# Patient Record
Sex: FEMALE | Race: WHITE | URBAN - METROPOLITAN AREA
[De-identification: names, ages, dates, MRNs, and addresses within clinical notes are randomized per-mention and may not be internally consistent; named-entity substitution may affect disease eponyms.]

---

## 2019-04-09 ENCOUNTER — PRE VISIT (OUTPATIENT)
Dept: UROLOGY | Facility: CLINIC | Age: 39
End: 2019-04-09

## 2019-04-09 NOTE — TELEPHONE ENCOUNTER
Reason for visit: UTI consult     Relevant information: pt self referred    Records/imaging/labs/orders: no records     Pt called: yes, generic message left     At Rooming: dip/pvr

## 2019-04-18 ENCOUNTER — ANCILLARY PROCEDURE (OUTPATIENT)
Dept: GENERAL RADIOLOGY | Facility: CLINIC | Age: 39
End: 2019-04-18
Attending: UROLOGY
Payer: COMMERCIAL

## 2019-04-18 ENCOUNTER — ANCILLARY PROCEDURE (OUTPATIENT)
Dept: CT IMAGING | Facility: CLINIC | Age: 39
End: 2019-04-18
Attending: UROLOGY
Payer: COMMERCIAL

## 2019-04-18 ENCOUNTER — OFFICE VISIT (OUTPATIENT)
Dept: UROLOGY | Facility: CLINIC | Age: 39
End: 2019-04-18
Payer: COMMERCIAL

## 2019-04-18 VITALS
SYSTOLIC BLOOD PRESSURE: 106 MMHG | DIASTOLIC BLOOD PRESSURE: 73 MMHG | WEIGHT: 116.8 LBS | BODY MASS INDEX: 18.77 KG/M2 | HEART RATE: 89 BPM | HEIGHT: 66 IN

## 2019-04-18 DIAGNOSIS — N39.0 RECURRENT UTI: ICD-10-CM

## 2019-04-18 DIAGNOSIS — N32.3 BLADDER DIVERTICULUM: ICD-10-CM

## 2019-04-18 DIAGNOSIS — N39.0 RECURRENT UTI: Primary | ICD-10-CM

## 2019-04-18 LAB
APPEARANCE UR: CLEAR
BILIRUB UR QL: NORMAL
COLOR UR: YELLOW
GLUCOSE URINE: NORMAL MG/DL
HGB UR QL: NORMAL
KETONES UR QL: 15 MG/DL
LEUKOCYTE ESTERASE URINE: NORMAL
NITRITE UR QL STRIP: NORMAL
PH UR STRIP: 6.5 PH (ref 5–7)
PROTEIN ALBUMIN URINE: NORMAL MG/DL
RADIOLOGIST FLAGS: NORMAL
SOURCE: NORMAL
SP GR UR STRIP: 1.01 (ref 1–1.03)
UROBILINOGEN UR QL STRIP: 0.2 EU/DL (ref 0.2–1)

## 2019-04-18 RX ORDER — IOPAMIDOL 755 MG/ML
100 INJECTION, SOLUTION INTRAVASCULAR ONCE
Status: COMPLETED | OUTPATIENT
Start: 2019-04-18 | End: 2019-04-18

## 2019-04-18 RX ORDER — SULFAMETHOXAZOLE/TRIMETHOPRIM 800-160 MG
1 TABLET ORAL ONCE
Status: ACTIVE | OUTPATIENT
Start: 2019-04-18

## 2019-04-18 RX ADMIN — IOPAMIDOL 25 ML: 755 INJECTION, SOLUTION INTRAVASCULAR at 15:58

## 2019-04-18 ASSESSMENT — PAIN SCALES - GENERAL
PAINLEVEL: NO PAIN (0)
PAINLEVEL: NO PAIN (0)

## 2019-04-18 ASSESSMENT — ENCOUNTER SYMPTOMS
DECREASED LIBIDO: 1
DYSURIA: 1
FLANK PAIN: 1
DIFFICULTY URINATING: 1
HEMATURIA: 0
HOT FLASHES: 0

## 2019-04-18 ASSESSMENT — MIFFLIN-ST. JEOR: SCORE: 1226.55

## 2019-04-18 NOTE — PROGRESS NOTES
2019    Referring Provider:Self    CC: Frequent UTI    HPI:  Domitila Goldberg is a 39 year old  female (3  sections) with a past medical history of endometriosis presents for evaluation of frequent e. coli UTI over the past 3 years.  The patient had no history of UTI before the birth of her last child 3 years ago.  Per report the cesaran section was unremarkable.  Since that time she has had culture positive infections every 6-12 weeks.  Presenting symptoms are typically urgency, frequency and dysuria.  Symptoms resolve with treatment.  It is uncertain if the patient has ever had a negative culture following treatment.  Her last UTI was in 2019.  She previously underwent CT abdomen/pelvis in 2018 without evidence of urolithiasis or other anatomic abnormalities.  She has not undergone cystoscopy in the past.  The patient has no smoking history or history of industrial exposures.      Past medical history - endometriosis    Past surgical history - Laparoscopy x 2;  section x 3    Family History   Problem Relation Age of Onset     Heart Defect Mother        Review of Systems     Constitutional:  Negative for fever, chills, weight loss, weight gain, fatigue, decreased appetite, night sweats, recent stressors, height gain, height loss, post-operative complications, incisional pain, hallucinations, increased energy, hyperactivity and confused.   HENT:  Negative for ear pain, hearing loss, tinnitus, nosebleeds, trouble swallowing, hoarse voice, mouth sores, sore throat, ear discharge, tooth pain, gum tenderness, taste disturbance, smell disturbance, hearing aid, bleeding gums, dry mouth, sinus pain, sinus congestion and neck mass.    Eyes:  Negative for double vision, pain, redness, eye pain, decreased vision, eye watering, eye bulging, eye dryness, flashing lights, spots, floaters, strabismus, tunnel vision, jaundice and eye irritation.   Respiratory:   Negative for cough,  hemoptysis, sputum production, shortness of breath, wheezing, sleep disturbances due to breathing, snores loudly, respiratory pain, dyspnea on exertion, cough disturbing sleep and postural dyspnea.    Cardiovascular:  Negative for chest pain, dyspnea on exertion, palpitations, orthopnea, claudication, leg swelling, fingers/toes turn blue, hypertension, hypotension, syncope, history of heart murmur, chest pain on exertion, chest pain at rest, pacemaker, few scattered varicosities, leg pain, sleep disturbances due to breathing, tachycardia, light-headedness, exercise intolerance and edema.   Gastrointestinal:  Negative for heartburn, nausea, vomiting, abdominal pain, diarrhea, constipation, blood in stool, melena, rectal pain, bloating, hemorrhoids, bowel incontinence, jaundice, rectal bleeding, coffee ground emesis and change in stool.   Genitourinary:  Positive for bladder incontinence, dysuria, flank pain, difficulty urinating, dyspareunia, decreased libido and excessive menstruation. Negative for urgency, hematuria, vaginal discharge, genital sores, nocturia, voiding less frequently, arousal difficulty, abnormal vaginal bleeding, menstrual changes, hot flashes, vaginal dryness and postmenopausal bleeding.   Musculoskeletal:  Negative for myalgias, back pain, joint swelling, arthralgias, stiffness, muscle cramps, neck pain, bone pain, muscle weakness and fracture.   Skin:  Negative for nail changes, itching, poor wound healing, rash, hair changes, skin changes, acne, warts, poor wound healing, scarring, flaky skin, Raynaud's phenomenon, sensitivity to sunlight and skin thickening.   Neurological:  Negative for dizziness, tingling, tremors, speech change, seizures, loss of consciousness, weakness, light-headedness, numbness, headaches, disturbances in coordination, extremity numbness, memory loss, difficulty walking and paralysis.   Endo/Heme:  Negative for anemia, swollen glands and bruises/bleeds easily.  "  Psychiatric/Behavioral:  Negative for depression, hallucinations, memory loss, decreased concentration, mood swings and panic attacks.    Breast:  Negative for breast discharge, breast mass, breast pain and nipple retraction.   Endocrine:  Negative for altered temperature regulation, polyphagia, polydipsia, unwanted hair growth and change in facial hair.    Allergies   Allergen Reactions     Penicillins Hives     As child     Current Outpatient Medications   Medication     Multiple Vitamins-Iron (MULTIPLE VITAMIN/IRON OR)     No current facility-administered medications for this visit.      /73   Pulse 89   Ht 1.676 m (5' 6\")   Wt 53 kg (116 lb 12.8 oz)   BMI 18.85 kg/m   No LMP recorded. Body mass index is 18.85 kg/m .  She is alert and oriented.  She is well groomed, comfortable in no acute distress. Normal mood and affect. Pupils are equal and reactive, no scleral icterus.  Normocephalic without masses, lesions, obvious abnormalities. Non labored breathing.  Abdomen is soft, non-tender, non-distended, no CVAT, no organomegaly. Normal external female genitalia.  Negative ESST.  Pelvic exam is unremarkable.  Normal range of motion in her extremities.  Skin warm without obvious lesions.  No lower extremity edema.  Normal gait.      Cystoscopy:  -Given the temporal relationship to the patient's  section and the fact that patient lives a distance away a decision was made to proceed with in-office cystoscopy to evaluate for foreign bodies and/or anatomic abnormalities.  The patient was prepped and draped in a the normal sterile fashion.  A flexible cystosope was introduced into a well lubricated urethra.  The urethra was unremarkable.  Both UOs were identified.  A transverse ridge was noted along the anterior bladder wall without apparent foreign body present.  A large diverticulum was noted at the bladder dome - the wall was found to be extremely thin with the overlying bowel visible. No other " abnormalities were noted.  Pullback urethroscopy was unremarkable.      CT cystogram/Post-void KUB images were reviewed with the patient  -Given the findings on cystoscopy we ordered a CT cystogram that was performed today  -Cystogram reveals large bladder dome diverticulum.  Postvoid KUB reveals incomplete voiding    A/P: Domitila Goldberg is a 39 year old F with recurrent UTI following  section 3 years ago.  Given the noted findings on cystoscopy and imaging along with the temporal relationship of the patient's emergency  section to the onset of her infections, there is concern that a bladder injury may have resulted in a poorly draining bladder diverticulum.  We will plan to send urine for culture today and have the patient follow up in 3 months for further evaluation and discussion of possible intervention should infections continue.    -Urine culture today  -Follow up in 3 months     Addendum:    The patient was seen and evaluated with the resident.  I was present for the cystoscopy procedure.  The plan was formulated in conjunction with me and I agree with the above note with changes made as necessary.    Discussed that at this time it is not clear the etiology of the UTIs but would like to do more careful monitoring of her urine so if she things she has a UTI I would like her to contact the clinic so we can monitor    Discussed options to prevent UTI including daily antibiotic prophylaxis, methenamine and supplements.  She is most interested in supplements.      Ultimately at this time it is not fully clear if an incompletely emptying diverticulum is contributing and discussed that if we discussed more aggressive management that it would involve surgery to remove the diverticulum.  We did discuss however things for her to seek urgent medical attention for given how thin the diverticular wall is.    60 minutes were spent with patient today, >50% in counseling and coordination of care including  the time of the cystoscopy    Julissa Nunez MD MPH   of Urology    CC  No care team member to display  SELF, REFERRED

## 2019-04-18 NOTE — NURSING NOTE
Chief Complaint   Patient presents with     Consult     Freddy   Invasive Procedure Safety Checklist:    Procedure: Cystoscopy    Action: Complete sections and checkboxes as appropriate.    Pre-procedure:  1. Patient ID Verified with 2 identifiers (Carmen and  or MRN) : YES    2. Procedure and site verified with patient/designee (when able) : YES    3. Accurate consent documentation in medical record : YES    4. H&P (or appropriate assessment) documented in medical record : N/A  H&P must be up to 30 days prior to procedure an updated within 24 hours of                 Procedure as applicable.     5. Relevant diagnostic and radiology test results appropriately labeled and displayed as applicable : YES    6. Blood products, implants, devices, and/or special equipment available for the procedure as applicable : YES    7. Procedure site(s) marked with provider initials [Exclusions: N/A] : YES    8. Marking not required. Reason : Yes  Procedure does not require site marking    Time Out:     Time-Out performed immediately prior to starting procedure, including verbal and active participation of all team members addressing: YES    1. Correct patient identity.  2. Confirmed that the correct side and site are marked.  3. An accurate procedure to be done.  4. Agreement on the procedure to be done.  5. Correct patient position.  6. Relevant images and results are properly labeled and appropriately displayed.  7. The need to administer antibiotics or fluids for irrigation purposes during the procedure as applicable.  8. Safety precautions based on patient history or medication use.    During Procedure: Verification of correct person, site, and procedure occurs any time the responsibility for care of the patient is transferred to another member of the care team.  The following medication was given:     MEDICATION:  Lidocaine Jelly  ROUTE: Urethral  SITE: Urethral  DOSE: 10ml  LOT #: [id number]  : Jeannie AUSTIN  EXPIRATION  "DATE: 12-20  NDC#: [address]-8   Was there drug waste? No    Prior to administraion, verified patient identity using patient's name and date of birth.    Drug Amount Wasted:  None.  Vial/Syringe: Single dose vial    Lynn Addison LPN  April 18, 2019      Blood pressure 106/73, pulse 89, height 1.676 m (5' 6\"), weight 53 kg (116 lb 12.8 oz). Body mass index is 18.85 kg/m .    There is no problem list on file for this patient.      Allergies   Allergen Reactions     Penicillins Hives     As child       Current Outpatient Medications   Medication Sig Dispense Refill     Multiple Vitamins-Iron (MULTIPLE VITAMIN/IRON OR)          Social History     Tobacco Use     Smoking status: Never Smoker     Smokeless tobacco: Never Used   Substance Use Topics     Alcohol use: Never     Drug use: Never       Lynn Addison LPN  4/18/2019  1:56 PM     "

## 2019-04-18 NOTE — LETTER
Date:April 25, 2019      Patient was self referred, no letter generated. Do not send.        [facility] Health Information

## 2019-04-18 NOTE — LETTER
2019       RE: Domitila Goldberg  [address]  [place] [facility] [address]     Dear Colleague,    Thank you for referring your patient, Domitila Goldberg, to the [facility] AND [facility] PROSTATE [facility] UROLOGIC CANCERS at [facility]. Please see a copy of my visit note below.    2019    Referring Provider:Self    CC: Frequent UTI    HPI:  oDmitila Goldberg is a 39 year old  female (3  sections) with a past medical history of endometriosis presents for evaluation of frequent e. coli UTI over the past 3 years.  The patient had no history of UTI before the birth of her last child 3 years ago.  Per report the cesaran section was unremarkable.  Since that time she has had culture positive infections every 6-12 weeks.  Presenting symptoms are typically urgency, frequency and dysuria.  Symptoms resolve with treatment.  It is uncertain if the patient has ever had a negative culture following treatment.  Her last UTI was in 2019.  She previously underwent CT abdomen/pelvis in 2018 without evidence of urolithiasis or other anatomic abnormalities.  She has not undergone cystoscopy in the past.  The patient has no smoking history or history of industrial exposures.      Past medical history - endometriosis    Past surgical history - Laparoscopy x 2;  section x 3    Family History   Problem Relation Age of Onset     Heart Defect Mother        Review of Systems     Constitutional:  Negative for fever, chills, weight loss, weight gain, fatigue, decreased appetite, night sweats, recent stressors, height gain, height loss, post-operative complications, incisional pain, hallucinations, increased energy, hyperactivity and confused.   HENT:  Negative for ear pain, hearing loss, tinnitus, nosebleeds, trouble swallowing, hoarse voice, mouth sores, sore throat, ear discharge, tooth pain, gum tenderness, taste disturbance, smell disturbance, hearing aid,  bleeding gums, dry mouth, sinus pain, sinus congestion and neck mass.    Eyes:  Negative for double vision, pain, redness, eye pain, decreased vision, eye watering, eye bulging, eye dryness, flashing lights, spots, floaters, strabismus, tunnel vision, jaundice and eye irritation.   Respiratory:   Negative for cough, hemoptysis, sputum production, shortness of breath, wheezing, sleep disturbances due to breathing, snores loudly, respiratory pain, dyspnea on exertion, cough disturbing sleep and postural dyspnea.    Cardiovascular:  Negative for chest pain, dyspnea on exertion, palpitations, orthopnea, claudication, leg swelling, fingers/toes turn blue, hypertension, hypotension, syncope, history of heart murmur, chest pain on exertion, chest pain at rest, pacemaker, few scattered varicosities, leg pain, sleep disturbances due to breathing, tachycardia, light-headedness, exercise intolerance and edema.   Gastrointestinal:  Negative for heartburn, nausea, vomiting, abdominal pain, diarrhea, constipation, blood in stool, melena, rectal pain, bloating, hemorrhoids, bowel incontinence, jaundice, rectal bleeding, coffee ground emesis and change in stool.   Genitourinary:  Positive for bladder incontinence, dysuria, flank pain, difficulty urinating, dyspareunia, decreased libido and excessive menstruation. Negative for urgency, hematuria, vaginal discharge, genital sores, nocturia, voiding less frequently, arousal difficulty, abnormal vaginal bleeding, menstrual changes, hot flashes, vaginal dryness and postmenopausal bleeding.   Musculoskeletal:  Negative for myalgias, back pain, joint swelling, arthralgias, stiffness, muscle cramps, neck pain, bone pain, muscle weakness and fracture.   Skin:  Negative for nail changes, itching, poor wound healing, rash, hair changes, skin changes, acne, warts, poor wound healing, scarring, flaky skin, Raynaud's phenomenon, sensitivity to sunlight and skin thickening.   Neurological:   "Negative for dizziness, tingling, tremors, speech change, seizures, loss of consciousness, weakness, light-headedness, numbness, headaches, disturbances in coordination, extremity numbness, memory loss, difficulty walking and paralysis.   Endo/Heme:  Negative for anemia, swollen glands and bruises/bleeds easily.   Psychiatric/Behavioral:  Negative for depression, hallucinations, memory loss, decreased concentration, mood swings and panic attacks.    Breast:  Negative for breast discharge, breast mass, breast pain and nipple retraction.   Endocrine:  Negative for altered temperature regulation, polyphagia, polydipsia, unwanted hair growth and change in facial hair.    Allergies   Allergen Reactions     Penicillins Hives     As child     Current Outpatient Medications   Medication     Multiple Vitamins-Iron (MULTIPLE VITAMIN/IRON OR)     No current facility-administered medications for this visit.      /73   Pulse 89   Ht 1.676 m (5' 6\")   Wt 53 kg (116 lb 12.8 oz)   BMI 18.85 kg/m    No LMP recorded. Body mass index is 18.85 kg/m .  She is alert and oriented.  She is well groomed, comfortable in no acute distress. Normal mood and affect. Pupils are equal and reactive, no scleral icterus.  Normocephalic without masses, lesions, obvious abnormalities. Non labored breathing.  Abdomen is soft, non-tender, non-distended, no CVAT, no organomegaly. Normal external female genitalia.  Negative ESST.  Pelvic exam is unremarkable.  Normal range of motion in her extremities.  Skin warm without obvious lesions.  No lower extremity edema.  Normal gait.      Cystoscopy:  -Given the temporal relationship to the patient's  section and the fact that patient lives a distance away a decision was made to proceed with in-office cystoscopy to evaluate for foreign bodies and/or anatomic abnormalities.  The patient was prepped and draped in a the normal sterile fashion.  A flexible cystosope was introduced into a well " lubricated urethra.  The urethra was unremarkable.  Both UOs were identified.  A transverse ridge was noted along the anterior bladder wall without apparent foreign body present.  A large diverticulum was noted at the bladder dome - the wall was found to be extremely thin with the overlying bowel visible. No other abnormalities were noted.  Pullback urethroscopy was unremarkable.      CT cystogram/Post-void KUB images were reviewed with the patient  -Given the findings on cystoscopy we ordered a CT cystogram that was performed today  -Cystogram reveals large bladder dome diverticulum.  Postvoid KUB reveals incomplete voiding    A/P: Domitila Goldberg is a 39 year old F with recurrent UTI following  section 3 years ago.  Given the noted findings on cystoscopy and imaging along with the temporal relationship of the patient's emergency  section to the onset of her infections, there is concern that a bladder injury may have resulted in a poorly draining bladder diverticulum.  We will plan to send urine for culture today and have the patient follow up in 3 months for further evaluation and discussion of possible intervention should infections continue.    -Urine culture today  -Follow up in 3 months     Addendum:    The patient was seen and evaluated with the resident.  The plan was formulated in conjunction with me and I agree with the above note with changes made as necessary.    Discussed that at this time it is not clear the etiology of the UTIs but would like to do more careful monitoring of her urine so if she things she has a UTI I would like her to contact the clinic so we can monitor    Discussed options to prevent UTI including daily antibiotic prophylaxis, methenamine and supplements.  She is most interested in supplements.      Ultimately at this time it is not fully clear if an incompletely emptying diverticulum is contributing and discussed that if we discussed more aggressive management that  it would involve surgery to remove the diverticulum.  We did discuss however things for her to seek urgent medical attention for given how thin the diverticular wall is.    60 minutes were spent with patient today, >50% in counseling and coordination of care    Julissa Nunez MD MPH   of Urology      No care team member to display  SELF, REFERRED                      Again, thank you for allowing me to participate in the care of your patient.      Sincerely,    Julissa Nunez MD

## 2019-04-18 NOTE — NURSING NOTE
"Chief Complaint   Patient presents with     Consult     Freddy       Blood pressure 106/73, pulse 89, height 1.676 m (5' 6\"), weight 53 kg (116 lb 12.8 oz). Body mass index is 18.85 kg/m .    There is no problem list on file for this patient.      Allergies   Allergen Reactions     Penicillins Hives     As child       Current Outpatient Medications   Medication Sig Dispense Refill     Multiple Vitamins-Iron (MULTIPLE VITAMIN/IRON OR)          Social History     Tobacco Use     Smoking status: Never Smoker     Smokeless tobacco: Never Used   Substance Use Topics     Alcohol use: Never     Drug use: Never       ROLLY Cochran  4/18/2019  1:16 PM     "

## 2019-04-18 NOTE — PATIENT INSTRUCTIONS
Supplements to prevent UTI  -probiotics  -cranberry   [employer]ura: [email or web address]   Theracran HP by [employer]  -d-mannose    Any questions please contact us at [phone] or [phone]    It was a pleasure meeting with you today.  Thank you for allowing me and my team the privilege of caring for you today.  YOU are the reason we are here, and I truly hope we provided you with the excellent service you deserve.  Please let us know if there is anything else we can do for you so that we can be sure you are leaving completely satisfied with your care experience.    Cystoscopy    Cystoscopy is a procedure that lets your doctor look directly inside your urethra and bladder. It can be used to:    Help diagnose a problem with your urethra, bladder, or kidneys.    Take a sample (biopsy) of bladder or urethral tissue.    Treat certain problems (such as removing kidney stones).    Place a stent to bypass an obstruction.    Take special X-rays of the kidneys.  Based on the findings, your doctor may recommend other tests or treatments.  What is a cystoscope?  A cystoscope is a telescope-like instrument that contains lenses and fiberoptics (small glass wires that make bright light). The cystoscope may be straight and rigid, or flexible to bend around curves in the urethra. The doctor may look directly into the cystoscope, or project the image onto a monitor.  Getting ready    Ask your doctor if you should stop taking any medicines before the procedure.    Follow any other instructions your doctor gives you.  Tell your doctor before the exam if you:    Take any medicines, such as aspirin or blood thinners    Have allergies to any medicines    Are pregnant   The procedure  Cystoscopy is done in the doctor s office, surgery center, or hospital. The doctor and a nurse are present during the procedure. It takes only a few minutes, longer if a biopsy, X-ray, or treatment needs to be done.  During the procedure:    You lie on an exam  table on your back, knees bent and legs apart. You are covered with a drape.    Your urethra and the area around it are washed. Anesthetic jelly may be applied to numb the urethra.    The cystoscope is inserted. A sterile fluid is put into the bladder to expand it. You may feel pressure from this fluid.    When the procedure is done, the cystoscope is removed.  After the procedure   Once you re home:    Drink plenty of fluids.    You may have burning or light bleeding when you urinate this is normal.    Medicines may be prescribed to ease any discomfort or prevent infection. Take these as directed.    Call your doctor if you have heavy bleeding or blood clots, burning that lasts more than a day, a fever over 100 F  (38  C), or trouble urinating.  Date Last Reviewed: 1/1/2017 2000-2017 [employer]. [address] [place] [id number]. All rights reserved. This information is not intended as a substitute for professional medical care. Always follow your healthcare professional's instructions.

## 2019-04-18 NOTE — DISCHARGE INSTRUCTIONS

## 2019-04-18 NOTE — NURSING NOTE
The following medication was given:     MEDICATION:  Sulfamethoxazole and trimethoprim  ROUTE: PO  SITE: Medication was given orally   DOSE: 800 mg/160 mg  LOT #: [id number]  : Ryan  EXPIRATION DATE: 09/2019  NDC#: [address]-90   Was there drug waste? No    Prior to administration, verified patient identity using patient's name and date of birth.  Due to administration, patient instructed to remain in clinic for 15 minutes  afterwards, and to report any adverse reaction to me immediately.        Drug Amount Wasted:  None.  Vial/Syringe: single dose    Verna Polk CMA  April 18, 2019

## 2019-04-24 ASSESSMENT — ENCOUNTER SYMPTOMS
DECREASED CONCENTRATION: 0
POLYDIPSIA: 0
SHORTNESS OF BREATH: 0
WEIGHT GAIN: 0
TREMORS: 0
INCREASED ENERGY: 0
MUSCLE CRAMPS: 0
HEMOPTYSIS: 0
JOINT SWELLING: 0
HEMATURIA: 0
NAUSEA: 0
CONSTIPATION: 0
BREAST PAIN: 0
DIFFICULTY URINATING: 1
RECTAL BLEEDING: 0
WHEEZING: 0
JAUNDICE: 0
MEMORY LOSS: 0
CLAUDICATION: 0
EYE IRRITATION: 0
PANIC: 0
POOR WOUND HEALING: 0
LEG PAIN: 0
SYNCOPE: 0
SORE THROAT: 0
NIGHT SWEATS: 0
SKIN CHANGES: 0
PALPITATIONS: 0
EYE REDNESS: 0
HYPERTENSION: 0
MUSCLE WEAKNESS: 0
INSOMNIA: 0
TASTE DISTURBANCE: 0
BRUISES/BLEEDS EASILY: 0
EYE PAIN: 0
SPEECH CHANGE: 0
FEVER: 0
STIFFNESS: 0
SWOLLEN GLANDS: 0
HOARSE VOICE: 0
HEARTBURN: 0
DISTURBANCES IN COORDINATION: 0
WEIGHT LOSS: 0
TINGLING: 0
NECK MASS: 0
ORTHOPNEA: 0
MYALGIAS: 0
POSTURAL DYSPNEA: 0
BOWEL INCONTINENCE: 0
DEPRESSION: 0
SNORES LOUDLY: 0
COUGH: 0
POLYPHAGIA: 0
BACK PAIN: 0
LOSS OF CONSCIOUSNESS: 0
WEAKNESS: 0
SINUS PAIN: 0
HYPOTENSION: 0
PARALYSIS: 0
BLOATING: 0
HOT FLASHES: 0
ALTERED TEMPERATURE REGULATION: 0
SLEEP DISTURBANCES DUE TO BREATHING: 0
HALLUCINATIONS: 0
DIZZINESS: 0
NAIL CHANGES: 0
SMELL DISTURBANCE: 0
DECREASED LIBIDO: 1
NERVOUS/ANXIOUS: 0
DECREASED APPETITE: 0
RESPIRATORY PAIN: 0
LEG SWELLING: 0
ARTHRALGIAS: 0
NUMBNESS: 0
DYSURIA: 1
SINUS CONGESTION: 0
ABDOMINAL PAIN: 0
LIGHT-HEADEDNESS: 0
RECTAL PAIN: 0
SEIZURES: 0
TACHYCARDIA: 0
NECK PAIN: 0
BREAST MASS: 0
TROUBLE SWALLOWING: 0
FATIGUE: 0
DIARRHEA: 0
DOUBLE VISION: 0
COUGH DISTURBING SLEEP: 0
HEADACHES: 0
EYE WATERING: 0
EXERCISE INTOLERANCE: 0
BLOOD IN STOOL: 0
SPUTUM PRODUCTION: 0
FLANK PAIN: 1
EXTREMITY NUMBNESS: 0
DYSPNEA ON EXERTION: 0
CHILLS: 0
VOMITING: 0

## 2024-08-26 ENCOUNTER — OFFICE VISIT (OUTPATIENT)
Dept: OBSTETRICS AND GYNECOLOGY | Facility: CLINIC | Age: 44
End: 2024-08-26
Payer: COMMERCIAL

## 2024-08-26 VITALS
SYSTOLIC BLOOD PRESSURE: 108 MMHG | DIASTOLIC BLOOD PRESSURE: 64 MMHG | BODY MASS INDEX: 19.77 KG/M2 | HEIGHT: 66 IN | WEIGHT: 123 LBS

## 2024-08-26 DIAGNOSIS — Z12.31 BREAST CANCER SCREENING BY MAMMOGRAM: ICD-10-CM

## 2024-08-26 DIAGNOSIS — Z01.419 PAP SMEAR, AS PART OF ROUTINE GYNECOLOGICAL EXAMINATION: ICD-10-CM

## 2024-08-26 DIAGNOSIS — Z11.51 SPECIAL SCREENING EXAMINATION FOR HUMAN PAPILLOMAVIRUS (HPV): ICD-10-CM

## 2024-08-26 DIAGNOSIS — R10.2 PELVIC PAIN: ICD-10-CM

## 2024-08-26 DIAGNOSIS — Z91.89 RELIES ON PARTNER VASECTOMY FOR CONTRACEPTION: ICD-10-CM

## 2024-08-26 DIAGNOSIS — Z01.419 ROUTINE GYNECOLOGICAL EXAMINATION: Primary | ICD-10-CM

## 2024-08-26 LAB
B-HCG UR QL: NEGATIVE
BILIRUB BLD-MCNC: NEGATIVE MG/DL
CLARITY, POC: CLEAR
COLOR UR: YELLOW
EXPIRATION DATE: NORMAL
GLUCOSE UR STRIP-MCNC: NEGATIVE MG/DL
INTERNAL NEGATIVE CONTROL: NORMAL
INTERNAL POSITIVE CONTROL: NORMAL
KETONES UR QL: NEGATIVE
LEUKOCYTE EST, POC: NEGATIVE
Lab: NORMAL
NITRITE UR-MCNC: NEGATIVE MG/ML
PH UR: 5 [PH] (ref 5–8)
PROT UR STRIP-MCNC: NEGATIVE MG/DL
RBC # UR STRIP: NEGATIVE /UL
SP GR UR: 1.03 (ref 1–1.03)
UROBILINOGEN UR QL: NORMAL

## 2024-08-26 PROCEDURE — 81025 URINE PREGNANCY TEST: CPT | Performed by: NURSE PRACTITIONER

## 2024-08-26 PROCEDURE — 81002 URINALYSIS NONAUTO W/O SCOPE: CPT | Performed by: NURSE PRACTITIONER

## 2024-08-26 PROCEDURE — 99386 PREV VISIT NEW AGE 40-64: CPT | Performed by: NURSE PRACTITIONER

## 2024-08-26 RX ORDER — SULFAMETHOXAZOLE AND TRIMETHOPRIM 400; 80 MG/1; MG/1
TABLET ORAL
COMMUNITY
Start: 2024-08-14

## 2024-08-26 NOTE — PROGRESS NOTES
New GYN Exam    CC- Here for AE/pelvic pain.     Ekaterina Ortez is a 44 y.o. female new patient who presents for AE/Pelvic pain  Periods are regular every 28-30 days, lasting 5-7 days. Cycles have lightened up a little with occasional clots. Cramping has become worse over the last 2 months; R>L; occasionally radiates around her back. Feels bloated.  Denies bladder issues or changes in bowel habits.   HX of endometriosis dx'd via laparoscopy in .  Hx of C/S x 3.    OB History          3    Para   3    Term   2       1    AB        Living   3         SAB        IAB        Ectopic        Molar        Multiple        Live Births   3          Obstetric Comments   C/S x 3               Menarche: 15 y.o.  Current contraception: vasectomy  History of abnormal Pap smear: no  History of abnormal mammogram: yes - showed fatty tissue  Family history of uterine, colon or ovarian cancer: no  Family history of breast cancer: no  H/o STDs: no  Last pap: 3-4 years ago  Last mammogram: diagnostic at 33 y.o.  Last colonoscopy: never had one  Gardasil: missed  VIK: none    Health Maintenance   Topic Date Due    Annual Gynecologic Pelvic and Breast Exam  Never done    MAMMOGRAM  Never done    TDAP/TD VACCINES (1 - Tdap) Never done    COVID-19 Vaccine ( -  season) Never done    HEPATITIS C SCREENING  Never done    ANNUAL PHYSICAL  Never done    PAP SMEAR  Never done    INFLUENZA VACCINE  2024    Pneumococcal Vaccine 0-64  Aged Out       Past Medical History:   Diagnosis Date    Endometriosis        Past Surgical History:   Procedure Laterality Date     SECTION      x 3    DIAGNOSTIC LAPAROSCOPY      x 2         Current Outpatient Medications:     sulfamethoxazole-trimethoprim (BACTRIM,SEPTRA) 400-80 MG tablet, TAKE 1 TABLET BY MOUTH AFTER INTERCOURSE AND 12 HOURS AFTER SEXUAL ACTIVITY, Disp: , Rfl:     Allergies   Allergen Reactions    Penicillins Anaphylaxis    Keflex [Cephalexin] Rash  "      Social History     Tobacco Use    Smoking status: Never    Smokeless tobacco: Never   Vaping Use    Vaping status: Never Used   Substance Use Topics    Alcohol use: Never    Drug use: Never       Family History   Problem Relation Age of Onset    Uterine cancer Neg Hx     Colon cancer Neg Hx     Ovarian cancer Neg Hx     Breast cancer Neg Hx        Review of Systems   Gastrointestinal:  Positive for abdominal distention. Negative for abdominal pain, constipation and diarrhea.   Genitourinary:  Positive for pelvic pain. Negative for breast discharge, breast lump, breast pain, difficulty urinating, dysuria and menstrual problem.   Musculoskeletal:  Positive for back pain.        /64   Ht 167.6 cm (66\")   Wt 55.8 kg (123 lb)   LMP 08/07/2024   BMI 19.85 kg/m²     Physical Exam  Vitals reviewed.   Constitutional:       General: She is awake. She is not in acute distress.     Appearance: She is not ill-appearing.   HENT:      Head: Normocephalic and atraumatic.   Eyes:      Conjunctiva/sclera: Conjunctivae normal.   Pulmonary:      Effort: Pulmonary effort is normal. No respiratory distress.   Chest:   Breasts:     Right: Normal.      Left: Normal.   Abdominal:      Palpations: Abdomen is soft. There is no mass.      Tenderness: There is no abdominal tenderness.   Genitourinary:     General: Normal vulva.      Exam position: Supine.      Pubic Area: No rash.       Labia:         Right: No rash.         Left: No rash.       Urethra: No urethral lesion.      Vagina: Normal.      Cervix: Normal.      Uterus: Normal.       Adnexa: Right adnexa normal and left adnexa normal.   Musculoskeletal:      Cervical back: Neck supple. No rigidity.   Lymphadenopathy:      Upper Body:      Right upper body: No axillary adenopathy.      Left upper body: No axillary adenopathy.      Lower Body: No right inguinal adenopathy. No left inguinal adenopathy.   Skin:     General: Skin is warm and dry.      Capillary Refill: " Capillary refill takes less than 2 seconds.   Neurological:      Mental Status: She is alert and oriented to person, place, and time.   Psychiatric:         Mood and Affect: Mood and affect normal.         Behavior: Behavior normal.            Assessment/Plan  1) AE  2) GYN HM: pap/HPV  SBE demonstrated and encouraged.  3) MMG:  due, will schedule  4) Colon Health: routine screening recommended if not up to date  5) STD screening: declines   6) Gardasil: missed  7) Contraception: vasectomy  8) family planning: childbearing completed: encourage folic acid daily  9) Body mass index is 19.85 kg/m². Diet and Exercise discussed  10) Smoking Status: No  11) Social: recently moved to this area 2 years ago  12) pelvic pain- schedule pelvic US      Diagnoses and all orders for this visit:    1. Routine gynecological examination (Primary)  -     POC Urinalysis Dipstick  -     POC Pregnancy, Urine  -     IGP, Apt HPV,rfx 16 / 18,45    2. Special screening examination for human papillomavirus (HPV)  -     IGP, Apt HPV,rfx 16 / 18,45    3. Pap smear, as part of routine gynecological examination  -     IGP, Apt HPV,rfx 16 / 18,45    4. Pelvic pain    5. Breast cancer screening by mammogram  -     Mammo Screening Digital Tomosynthesis Bilateral With CAD; Future    6. Relies on partner vasectomy for contraception        Follow up prn or 1 year    I spent 15 minutes on this encounter, before, during and after the visit, evaluating the patient, reviewing records and writing orders.      I spent 5 minutes on the separately reported service of pelvic pain. This time is not included in the time used to support the E/M service also reported today.       Isela Meng, APRN  08/26/2024  15:35 EDT

## 2024-08-27 ENCOUNTER — TELEPHONE (OUTPATIENT)
Dept: OBSTETRICS AND GYNECOLOGY | Facility: CLINIC | Age: 44
End: 2024-08-27
Payer: COMMERCIAL

## 2024-08-27 NOTE — PROGRESS NOTES
Pelvic US- IMP: UT is retroverted and appears normal in shape and contour. The bladder is seen midline (?prolapsed bladder) EL roger 0.8cm. There is a cyst with internal echos (? Collapsed/ruptured cyst) seen in right OV roger 1.9cmx1.5cm. Lt OV WNL.     Discussed US findings with patient via phone.  States she sees a urologist on routine basis for hx of recurrent UTI.  Takes OTC medication for cramping PRN.  Instructed to repeat US if pelvic cramping worsens.

## 2024-08-29 LAB
CYTOLOGIST CVX/VAG CYTO: NORMAL
CYTOLOGY CVX/VAG DOC CYTO: NORMAL
CYTOLOGY CVX/VAG DOC THIN PREP: NORMAL
DX ICD CODE: NORMAL
HPV I/H RISK 4 DNA CVX QL PROBE+SIG AMP: NEGATIVE
Lab: NORMAL
OTHER STN SPEC: NORMAL
STAT OF ADQ CVX/VAG CYTO-IMP: NORMAL

## 2025-02-12 ENCOUNTER — HOSPITAL ENCOUNTER (EMERGENCY)
Facility: HOSPITAL | Age: 45
Discharge: HOME | End: 2025-02-12
Attending: EMERGENCY MEDICINE | Admitting: EMERGENCY MEDICINE
Payer: COMMERCIAL

## 2025-02-12 ENCOUNTER — APPOINTMENT (OUTPATIENT)
Dept: CT IMAGING | Facility: HOSPITAL | Age: 45
End: 2025-02-12
Payer: COMMERCIAL

## 2025-02-12 VITALS
OXYGEN SATURATION: 99 % | BODY MASS INDEX: 19.61 KG/M2 | RESPIRATION RATE: 15 BRPM | DIASTOLIC BLOOD PRESSURE: 76 MMHG | HEIGHT: 66 IN | HEART RATE: 82 BPM | SYSTOLIC BLOOD PRESSURE: 101 MMHG | WEIGHT: 122 LBS | TEMPERATURE: 97.8 F

## 2025-02-12 DIAGNOSIS — S09.90XA CLOSED HEAD INJURY, INITIAL ENCOUNTER: ICD-10-CM

## 2025-02-12 DIAGNOSIS — S06.0X0A CONCUSSION WITHOUT LOSS OF CONSCIOUSNESS, INITIAL ENCOUNTER: Primary | ICD-10-CM

## 2025-02-12 PROCEDURE — 99284 EMERGENCY DEPT VISIT MOD MDM: CPT

## 2025-02-12 PROCEDURE — 70450 CT HEAD/BRAIN W/O DYE: CPT

## 2025-02-12 NOTE — ED NOTES
Patient to ER via car from home for trunk falling on head 2 days ago denies loc and blood thinners has headache

## 2025-02-12 NOTE — ED PROVIDER NOTES
EMERGENCY DEPARTMENT ENCOUNTER    Room Number:  S06/06  Date of encounter:  2025  PCP: Provider, No Known  Historian: Patient, spouse   Chronic or social conditions impacting care (social determinants of health): None    HPI:  Chief Complaint: Head injury, headache  A complete HPI/ROS/PMH/PSH/SH/FH are unobtainable due to: Nothing    Context: Ekaterina Ortez is a 44 y.o. female, who presents to the ED c/o acute head injury, headache.  Patient reports that the trunk of her car fell while she was working on the car.  It hit her on the crown of her head.  She was dazed after denies any loss of consciousness.  She did not fall to the ground.  She was feeling nauseated at that time.  Since that time she has complained about a global headache.  The headache has been fairly constant.  She has had associated nausea, photophobia.  She denies any overt neck pain.  She takes no blood thinners.  She denies any previous significant head injuries.    Review of prior external notes (non-ED):   Reviewed GYN office visit from 2024.  Patient being followed for routine medical care.    Review of prior external test results outside of this encounter:  None    PAST MEDICAL HISTORY  Active Ambulatory Problems     Diagnosis Date Noted    Relies on partner vasectomy for contraception 2024     Resolved Ambulatory Problems     Diagnosis Date Noted    No Resolved Ambulatory Problems     Past Medical History:   Diagnosis Date    Endometriosis          PAST SURGICAL HISTORY  Past Surgical History:   Procedure Laterality Date     SECTION      x 3    DIAGNOSTIC LAPAROSCOPY      x 2         FAMILY HISTORY  Family History   Problem Relation Age of Onset    Uterine cancer Neg Hx     Colon cancer Neg Hx     Ovarian cancer Neg Hx     Breast cancer Neg Hx          SOCIAL HISTORY  Social History     Socioeconomic History    Marital status:    Tobacco Use    Smoking status: Never    Smokeless tobacco: Never   Vaping Use     Vaping status: Never Used   Substance and Sexual Activity    Alcohol use: Never    Drug use: Never    Sexual activity: Yes     Partners: Male     Birth control/protection: Vasectomy         ALLERGIES  Penicillins and Keflex [cephalexin]        REVIEW OF SYSTEMS  All systems reviewed and negative except for those discussed in HPI.       PHYSICAL EXAM    I have reviewed the triage vital signs and nursing notes.    ED Triage Vitals [02/12/25 1235]   Temp Heart Rate Resp BP SpO2   97.8 °F (36.6 °C) 102 16 -- 99 %      Temp src Heart Rate Source Patient Position BP Location FiO2 (%)   -- -- -- -- --       Physical Exam  GENERAL: Alert, oriented, not distressed  HENT: Tenderness to the crown of the scalp without obvious deformity, laceration, palpable fracture.  EYES: no scleral icterus, EOMI  CV: regular rhythm, regular rate, no murmur  RESPIRATORY: normal effort, CTA  ABDOMEN: soft, nontender  MUSCULOSKELETAL: no deformity, FROM, no calf swelling or tenderness  NEURO: alert, moves all extremities, follows commands  SKIN: warm, dry    RADIOLOGY  CT Head Without Contrast    Result Date: 2/12/2025  CT HEAD WO CONTRAST-  HISTORY:  head injury  COMPARISON: None  FINDINGS: The brain ventricles are symmetrical. There is no evidence of hemorrhage, hydrocephalus or of acute infarction. Bone windows show no evidence of a calvarial fracture. Further evaluation could be performed with a MRI examination of the brain as indicated.   Radiation dose reduction techniques were utilized, including automated exposure modulation based on body size.  This report was finalized on 2/12/2025 2:04 PM by Dr. Wily Almanza M.D on Workstation: [id number]       I ordered the above noted radiological studies. Reviewed by me and discussed with radiologist.  See dictation for official radiology interpretation.      MEDICATIONS GIVEN IN ER    Medications - No data to display      ADDITIONAL ORDERS CONSIDERED BUT NOT ORDERED:  Admission was considered  but after careful review of the patient's presentation, physical examination, diagnostic results, and response to treatment the patient may be safely discharged with outpatient follow-up.       PROGRESS, DATA ANALYSIS, CONSULTS, AND MEDICAL DECISION MAKING    All labs have been independently interpreted by myself.  All radiology studies have been independently interpreted by myself and discussed with radiologist dictating the report.   EKGs independently interpreted by myself.  Discussion below represents my analysis of pertinent findings related to patient's condition, differential diagnosis, treatment plan and final disposition.    I have discussed case with Dr. Barker, emergency room physician.  He has performed his own bedside examination and agrees with treatment plan.    ED Course as of 02/12/25 2019 Wed Feb 12, 2025   1254 Patient presents with head injury 2 days ago.  Patient has had continued headache, dizziness, photophobia, nausea.  Clearly patient is a headache but given the duration of symptoms we will obtain a CT of the head to rule out intracranial hemorrhage. [EE]   1400 CT imaging of the brain independently interpreted myself shows no evidence of acute hemorrhage. [EE]   1415 Updated patient on workup.  She has no neurodeficits.  We will discharge.  Symptoms consistent with concussion.  We will have her follow-up with neurology if no improvement in the next 2 to 3 days. [EE]      ED Course User Index  [EE] Juan Dai PA       AS OF 20:19 EST VITALS:    BP - 101/76  HR - 82  TEMP - 97.8 °F (36.6 °C)  O2 SATS - 99%        DIAGNOSIS  Final diagnoses:   Concussion without loss of consciousness, initial encounter   Closed head injury, initial encounter         DISPOSITION  Discharged    Admission was considered but after careful review of the patient's presentation, physical examination, diagnostic results, and response to treatment the patient may be safely discharged with outpatient follow-up.          Dictated utilizing Juan Hensley PA  02/12/25 2019

## 2025-02-12 NOTE — ED PROVIDER NOTES
I supervised care provided by the midlevel provider.   We have discussed this patient's history, physical exam, and treatment plan.  I have reviewed the note and personally saw and examined the patient and agree with the plan of care.   I seen and evaluated this patient.  2 days ago she is repairing the braces on her burgos of her car.  Her 9-year-old child was folding up.  Unfortunately he got distracted the trunk of the car came back and hit her on the top of the head.  Since that time she has had persistent headache.  The headache will wax and wane in severity.  She denies any vision change or unsteady gait.  Had some nausea initially after it happened but no nausea at this time.  She is not on any blood thinning medicine.  Denies any focal weakness to arms or legs.    GENERAL: not distressed  HENT: nares patent  Head/location of pain is right at the vertex of her scalp.  Might be a little bit of swelling to palpation and there is tenderness to palpation.  There is no bleeding or dried blood.  Neck/ face are symmetric without gross deformity or swelling  EYES: no scleral icterus  CV: regular rhythm, regular rate with intact distal pulses  RESPIRATORY: normal effort and no respiratory distress  ABDOMEN: soft and nontender  MUSCULOSKELETAL: no deformity  NEURO: alert and appropriate, moves all extremities, follows commands  SKIN: warm, dry    Vital signs and nursing notes reviewed.    Plan       Check a CT scan of her head.  It sounds like she has had a concussion.  But persistent headache after the trauma warrants a CT scan.  She is neurologically intact.  All questions answered at this time.         Hernan Barker MD  02/12/25 2469

## 2025-05-06 ENCOUNTER — HOSPITAL ENCOUNTER (OUTPATIENT)
Facility: HOSPITAL | Age: 45
Setting detail: OBSERVATION
Discharge: HOME | End: 2025-05-10
Attending: EMERGENCY MEDICINE | Admitting: HOSPITALIST
Payer: COMMERCIAL

## 2025-05-06 ENCOUNTER — APPOINTMENT (OUTPATIENT)
Dept: CT IMAGING | Facility: HOSPITAL | Age: 45
End: 2025-05-06
Payer: COMMERCIAL

## 2025-05-06 DIAGNOSIS — M54.81 OCCIPITAL NEURALGIA OF LEFT SIDE: ICD-10-CM

## 2025-05-06 DIAGNOSIS — R51.9 ACUTE NONINTRACTABLE HEADACHE, UNSPECIFIED HEADACHE TYPE: ICD-10-CM

## 2025-05-06 DIAGNOSIS — I67.1 CEREBRAL ANEURYSM: ICD-10-CM

## 2025-05-06 DIAGNOSIS — R00.0 TACHYCARDIA, UNSPECIFIED: ICD-10-CM

## 2025-05-06 DIAGNOSIS — R51.9 ACUTE INTRACTABLE HEADACHE, UNSPECIFIED HEADACHE TYPE: Primary | ICD-10-CM

## 2025-05-06 LAB
ALBUMIN SERPL-MCNC: 4.5 G/DL (ref 3.5–5.2)
ALBUMIN/GLOB SERPL: 2 G/DL
ALP SERPL-CCNC: 41 U/L (ref 39–117)
ALT SERPL W P-5'-P-CCNC: 15 U/L (ref 1–33)
ANION GAP SERPL CALCULATED.3IONS-SCNC: 9 MMOL/L (ref 5–15)
AST SERPL-CCNC: 16 U/L (ref 1–32)
BASOPHILS # BLD AUTO: 0.05 10*3/MM3 (ref 0–0.2)
BASOPHILS NFR BLD AUTO: 0.9 % (ref 0–1.5)
BILIRUB SERPL-MCNC: 0.3 MG/DL (ref 0–1.2)
BUN SERPL-MCNC: 10 MG/DL (ref 6–20)
BUN/CREAT SERPL: 15.4 (ref 7–25)
CALCIUM SPEC-SCNC: 9 MG/DL (ref 8.6–10.5)
CHLORIDE SERPL-SCNC: 106 MMOL/L (ref 98–107)
CO2 SERPL-SCNC: 25 MMOL/L (ref 22–29)
CREAT SERPL-MCNC: 0.65 MG/DL (ref 0.57–1)
DEPRECATED RDW RBC AUTO: 38.3 FL (ref 37–54)
EGFRCR SERPLBLD CKD-EPI 2021: 111.5 ML/MIN/1.73
EOSINOPHIL # BLD AUTO: 0.06 10*3/MM3 (ref 0–0.4)
EOSINOPHIL NFR BLD AUTO: 1.1 % (ref 0.3–6.2)
ERYTHROCYTE [DISTWIDTH] IN BLOOD BY AUTOMATED COUNT: 11.5 % (ref 12.3–15.4)
GLOBULIN UR ELPH-MCNC: 2.3 GM/DL
GLUCOSE SERPL-MCNC: 97 MG/DL (ref 65–99)
HCT VFR BLD AUTO: 35.7 % (ref 34–46.6)
HGB BLD-MCNC: 12 G/DL (ref 12–15.9)
IMM GRANULOCYTES # BLD AUTO: 0.01 10*3/MM3 (ref 0–0.05)
IMM GRANULOCYTES NFR BLD AUTO: 0.2 % (ref 0–0.5)
INR PPP: 1.01 (ref 0.9–1.1)
LYMPHOCYTES # BLD AUTO: 1.84 10*3/MM3 (ref 0.7–3.1)
LYMPHOCYTES NFR BLD AUTO: 32.9 % (ref 19.6–45.3)
MCH RBC QN AUTO: 31 PG (ref 26.6–33)
MCHC RBC AUTO-ENTMCNC: 33.6 G/DL (ref 31.5–35.7)
MCV RBC AUTO: 92.2 FL (ref 79–97)
MONOCYTES # BLD AUTO: 0.37 10*3/MM3 (ref 0.1–0.9)
MONOCYTES NFR BLD AUTO: 6.6 % (ref 5–12)
NEUTROPHILS NFR BLD AUTO: 3.26 10*3/MM3 (ref 1.7–7)
NEUTROPHILS NFR BLD AUTO: 58.3 % (ref 42.7–76)
NRBC BLD AUTO-RTO: 0 /100 WBC (ref 0–0.2)
PLATELET # BLD AUTO: 280 10*3/MM3 (ref 140–450)
PMV BLD AUTO: 10.2 FL (ref 6–12)
POTASSIUM SERPL-SCNC: 3.6 MMOL/L (ref 3.5–5.2)
PROT SERPL-MCNC: 6.8 G/DL (ref 6–8.5)
PROTHROMBIN TIME: 13.2 SECONDS (ref 11.7–14.2)
RBC # BLD AUTO: 3.87 10*6/MM3 (ref 3.77–5.28)
SODIUM SERPL-SCNC: 140 MMOL/L (ref 136–145)
WBC NRBC COR # BLD AUTO: 5.59 10*3/MM3 (ref 3.4–10.8)

## 2025-05-06 PROCEDURE — 80053 COMPREHEN METABOLIC PANEL: CPT | Performed by: EMERGENCY MEDICINE

## 2025-05-06 PROCEDURE — 25810000003 SODIUM CHLORIDE 0.9 % SOLUTION: Performed by: EMERGENCY MEDICINE

## 2025-05-06 PROCEDURE — 85610 PROTHROMBIN TIME: CPT | Performed by: EMERGENCY MEDICINE

## 2025-05-06 PROCEDURE — 36415 COLL VENOUS BLD VENIPUNCTURE: CPT

## 2025-05-06 PROCEDURE — 99285 EMERGENCY DEPT VISIT HI MDM: CPT

## 2025-05-06 PROCEDURE — 96374 THER/PROPH/DIAG INJ IV PUSH: CPT

## 2025-05-06 PROCEDURE — 70496 CT ANGIOGRAPHY HEAD: CPT

## 2025-05-06 PROCEDURE — 85025 COMPLETE CBC W/AUTO DIFF WBC: CPT | Performed by: EMERGENCY MEDICINE

## 2025-05-06 PROCEDURE — 25510000001 IOPAMIDOL PER 1 ML: Performed by: EMERGENCY MEDICINE

## 2025-05-06 PROCEDURE — 25010000002 PROCHLORPERAZINE 10 MG/2ML SOLUTION: Performed by: EMERGENCY MEDICINE

## 2025-05-06 PROCEDURE — 70498 CT ANGIOGRAPHY NECK: CPT

## 2025-05-06 RX ORDER — DIPHENHYDRAMINE HYDROCHLORIDE 50 MG/ML
25 INJECTION, SOLUTION INTRAMUSCULAR; INTRAVENOUS ONCE
Status: DISCONTINUED | OUTPATIENT
Start: 2025-05-06 | End: 2025-05-10 | Stop reason: HOSPADM

## 2025-05-06 RX ORDER — SUMATRIPTAN SUCCINATE 100 MG/1
100 TABLET ORAL
Status: ON HOLD | COMMUNITY
End: 2025-05-07

## 2025-05-06 RX ORDER — IOPAMIDOL 755 MG/ML
100 INJECTION, SOLUTION INTRAVASCULAR
Status: COMPLETED | OUTPATIENT
Start: 2025-05-06 | End: 2025-05-06

## 2025-05-06 RX ORDER — SODIUM CHLORIDE 0.9 % (FLUSH) 0.9 %
10 SYRINGE (ML) INJECTION AS NEEDED
Status: DISCONTINUED | OUTPATIENT
Start: 2025-05-06 | End: 2025-05-10 | Stop reason: HOSPADM

## 2025-05-06 RX ORDER — PROCHLORPERAZINE EDISYLATE 5 MG/ML
10 INJECTION INTRAMUSCULAR; INTRAVENOUS ONCE
Status: COMPLETED | OUTPATIENT
Start: 2025-05-06 | End: 2025-05-06

## 2025-05-06 RX ADMIN — PROCHLORPERAZINE EDISYLATE 10 MG: 5 INJECTION INTRAMUSCULAR; INTRAVENOUS at 21:29

## 2025-05-06 RX ADMIN — IOPAMIDOL 95 ML: 755 INJECTION, SOLUTION INTRAVENOUS at 21:53

## 2025-05-06 RX ADMIN — SODIUM CHLORIDE 1000 ML: 9 INJECTION, SOLUTION INTRAVENOUS at 21:20

## 2025-05-07 ENCOUNTER — APPOINTMENT (OUTPATIENT)
Dept: INTERVENTIONAL RADIOLOGY/VASCULAR | Facility: HOSPITAL | Age: 45
End: 2025-05-07
Payer: COMMERCIAL

## 2025-05-07 ENCOUNTER — APPOINTMENT (OUTPATIENT)
Dept: CT IMAGING | Facility: HOSPITAL | Age: 45
End: 2025-05-07
Payer: COMMERCIAL

## 2025-05-07 PROBLEM — R51.9 HEADACHE: Status: ACTIVE | Noted: 2025-05-07

## 2025-05-07 LAB
ANION GAP SERPL CALCULATED.3IONS-SCNC: 8 MMOL/L (ref 5–15)
BUN SERPL-MCNC: 9 MG/DL (ref 6–20)
BUN/CREAT SERPL: 16.4 (ref 7–25)
CALCIUM SPEC-SCNC: 8.4 MG/DL (ref 8.6–10.5)
CHLORIDE SERPL-SCNC: 110 MMOL/L (ref 98–107)
CO2 SERPL-SCNC: 22 MMOL/L (ref 22–29)
CREAT SERPL-MCNC: 0.55 MG/DL (ref 0.57–1)
DEPRECATED RDW RBC AUTO: 40.3 FL (ref 37–54)
EGFRCR SERPLBLD CKD-EPI 2021: 116.1 ML/MIN/1.73
ERYTHROCYTE [DISTWIDTH] IN BLOOD BY AUTOMATED COUNT: 11.7 % (ref 12.3–15.4)
GLUCOSE BLDC GLUCOMTR-MCNC: 120 MG/DL (ref 70–130)
GLUCOSE BLDC GLUCOMTR-MCNC: 137 MG/DL (ref 70–130)
GLUCOSE SERPL-MCNC: 103 MG/DL (ref 65–99)
HCT VFR BLD AUTO: 32.1 % (ref 34–46.6)
HGB BLD-MCNC: 10.6 G/DL (ref 12–15.9)
MCH RBC QN AUTO: 31.4 PG (ref 26.6–33)
MCHC RBC AUTO-ENTMCNC: 33 G/DL (ref 31.5–35.7)
MCV RBC AUTO: 95 FL (ref 79–97)
PLATELET # BLD AUTO: 222 10*3/MM3 (ref 140–450)
PMV BLD AUTO: 10.3 FL (ref 6–12)
POTASSIUM SERPL-SCNC: 3.8 MMOL/L (ref 3.5–5.2)
RBC # BLD AUTO: 3.38 10*6/MM3 (ref 3.77–5.28)
SODIUM SERPL-SCNC: 140 MMOL/L (ref 136–145)
WBC NRBC COR # BLD AUTO: 5.9 10*3/MM3 (ref 3.4–10.8)

## 2025-05-07 PROCEDURE — C1887 CATHETER, GUIDING: HCPCS | Performed by: NEUROLOGICAL SURGERY

## 2025-05-07 PROCEDURE — 36224 PLACE CATH CAROTD ART: CPT | Performed by: NEUROLOGICAL SURGERY

## 2025-05-07 PROCEDURE — 75898 FOLLOW-UP ANGIOGRAPHY: CPT

## 2025-05-07 PROCEDURE — 75894 X-RAYS TRANSCATH THERAPY: CPT

## 2025-05-07 PROCEDURE — 25010000002 LIDOCAINE 2% SOLUTION

## 2025-05-07 PROCEDURE — 25010000002 FENTANYL CITRATE (PF) 50 MCG/ML SOLUTION

## 2025-05-07 PROCEDURE — C1769 GUIDE WIRE: HCPCS | Performed by: NEUROLOGICAL SURGERY

## 2025-05-07 PROCEDURE — 25810000003 LACTATED RINGERS PER 1000 ML

## 2025-05-07 PROCEDURE — G0378 HOSPITAL OBSERVATION PER HR: HCPCS

## 2025-05-07 PROCEDURE — 25010000002 MIDAZOLAM PER 1 MG

## 2025-05-07 PROCEDURE — 25010000002 HEPARIN (PORCINE) PER 1000 UNITS

## 2025-05-07 PROCEDURE — 25010000002 SUGAMMADEX 200 MG/2ML SOLUTION

## 2025-05-07 PROCEDURE — 75894 X-RAYS TRANSCATH THERAPY: CPT | Performed by: NEUROLOGICAL SURGERY

## 2025-05-07 PROCEDURE — C1894 INTRO/SHEATH, NON-LASER: HCPCS | Performed by: NEUROLOGICAL SURGERY

## 2025-05-07 PROCEDURE — 25010000002 PROPOFOL 10 MG/ML EMULSION

## 2025-05-07 PROCEDURE — 25510000002 IODIXANOL PER 1 ML: Performed by: NEUROLOGICAL SURGERY

## 2025-05-07 PROCEDURE — 82948 REAGENT STRIP/BLOOD GLUCOSE: CPT

## 2025-05-07 PROCEDURE — C1760 CLOSURE DEV, VASC: HCPCS | Performed by: NEUROLOGICAL SURGERY

## 2025-05-07 PROCEDURE — 61624 TCAT PERM OCCLS/EMBOLJ CNS: CPT

## 2025-05-07 PROCEDURE — 25010000002 HEPARIN (PORCINE) PER 1000 UNITS: Performed by: NEUROLOGICAL SURGERY

## 2025-05-07 PROCEDURE — 61624 TCAT PERM OCCLS/EMBOLJ CNS: CPT | Performed by: NEUROLOGICAL SURGERY

## 2025-05-07 PROCEDURE — 99203 OFFICE O/P NEW LOW 30 MIN: CPT | Performed by: STUDENT IN AN ORGANIZED HEALTH CARE EDUCATION/TRAINING PROGRAM

## 2025-05-07 PROCEDURE — 25010000002 HYDROMORPHONE PER 4 MG

## 2025-05-07 PROCEDURE — 25010000002 DEXAMETHASONE PER 1 MG

## 2025-05-07 PROCEDURE — 96361 HYDRATE IV INFUSION ADD-ON: CPT

## 2025-05-07 PROCEDURE — 85027 COMPLETE CBC AUTOMATED: CPT

## 2025-05-07 PROCEDURE — C1889 IMPLANT/INSERT DEVICE, NOC: HCPCS | Performed by: NEUROLOGICAL SURGERY

## 2025-05-07 PROCEDURE — 70450 CT HEAD/BRAIN W/O DYE: CPT

## 2025-05-07 PROCEDURE — 75898 FOLLOW-UP ANGIOGRAPHY: CPT | Performed by: NEUROLOGICAL SURGERY

## 2025-05-07 PROCEDURE — 80048 BASIC METABOLIC PNL TOTAL CA: CPT

## 2025-05-07 PROCEDURE — 25010000002 ONDANSETRON PER 1 MG

## 2025-05-07 PROCEDURE — 25010000002 CEFAZOLIN PER 500 MG

## 2025-05-07 PROCEDURE — 76377 3D RENDER W/INTRP POSTPROCES: CPT

## 2025-05-07 PROCEDURE — 25010000002 PHENYLEPHRINE 10 MG/ML SOLUTION

## 2025-05-07 PROCEDURE — 96375 TX/PRO/DX INJ NEW DRUG ADDON: CPT

## 2025-05-07 RX ORDER — LABETALOL HYDROCHLORIDE 5 MG/ML
5 INJECTION, SOLUTION INTRAVENOUS
Status: DISCONTINUED | OUTPATIENT
Start: 2025-05-07 | End: 2025-05-07 | Stop reason: HOSPADM

## 2025-05-07 RX ORDER — DIPHENHYDRAMINE HYDROCHLORIDE 50 MG/ML
12.5 INJECTION, SOLUTION INTRAMUSCULAR; INTRAVENOUS
Status: DISCONTINUED | OUTPATIENT
Start: 2025-05-07 | End: 2025-05-07 | Stop reason: HOSPADM

## 2025-05-07 RX ORDER — SODIUM CHLORIDE 9 MG/ML
40 INJECTION, SOLUTION INTRAVENOUS AS NEEDED
Status: DISCONTINUED | OUTPATIENT
Start: 2025-05-07 | End: 2025-05-10 | Stop reason: HOSPADM

## 2025-05-07 RX ORDER — ROCURONIUM BROMIDE 10 MG/ML
INJECTION, SOLUTION INTRAVENOUS AS NEEDED
Status: DISCONTINUED | OUTPATIENT
Start: 2025-05-07 | End: 2025-05-07 | Stop reason: SURG

## 2025-05-07 RX ORDER — DROPERIDOL 2.5 MG/ML
0.62 INJECTION, SOLUTION INTRAMUSCULAR; INTRAVENOUS
Status: DISCONTINUED | OUTPATIENT
Start: 2025-05-07 | End: 2025-05-07 | Stop reason: HOSPADM

## 2025-05-07 RX ORDER — FENTANYL CITRATE 50 UG/ML
50 INJECTION, SOLUTION INTRAMUSCULAR; INTRAVENOUS
Status: DISCONTINUED | OUTPATIENT
Start: 2025-05-07 | End: 2025-05-07 | Stop reason: HOSPADM

## 2025-05-07 RX ORDER — MIDAZOLAM HYDROCHLORIDE 1 MG/ML
1 INJECTION, SOLUTION INTRAMUSCULAR; INTRAVENOUS
Status: DISCONTINUED | OUTPATIENT
Start: 2025-05-07 | End: 2025-05-07

## 2025-05-07 RX ORDER — SODIUM CHLORIDE, SODIUM LACTATE, POTASSIUM CHLORIDE, CALCIUM CHLORIDE 600; 310; 30; 20 MG/100ML; MG/100ML; MG/100ML; MG/100ML
9 INJECTION, SOLUTION INTRAVENOUS CONTINUOUS
Status: ACTIVE | OUTPATIENT
Start: 2025-05-07 | End: 2025-05-08

## 2025-05-07 RX ORDER — ASPIRIN 81 MG/1
81 TABLET ORAL DAILY
Status: DISCONTINUED | OUTPATIENT
Start: 2025-05-07 | End: 2025-05-10 | Stop reason: HOSPADM

## 2025-05-07 RX ORDER — ONDANSETRON 2 MG/ML
4 INJECTION INTRAMUSCULAR; INTRAVENOUS EVERY 6 HOURS PRN
Status: DISCONTINUED | OUTPATIENT
Start: 2025-05-07 | End: 2025-05-10 | Stop reason: HOSPADM

## 2025-05-07 RX ORDER — ONDANSETRON 4 MG/1
4 TABLET, ORALLY DISINTEGRATING ORAL EVERY 6 HOURS PRN
Status: DISCONTINUED | OUTPATIENT
Start: 2025-05-07 | End: 2025-05-10 | Stop reason: HOSPADM

## 2025-05-07 RX ORDER — IODIXANOL 320 MG/ML
300 INJECTION, SOLUTION INTRAVASCULAR
Status: COMPLETED | OUTPATIENT
Start: 2025-05-07 | End: 2025-05-07

## 2025-05-07 RX ORDER — LIDOCAINE HYDROCHLORIDE 20 MG/ML
INJECTION, SOLUTION INFILTRATION; PERINEURAL AS NEEDED
Status: DISCONTINUED | OUTPATIENT
Start: 2025-05-07 | End: 2025-05-07 | Stop reason: SURG

## 2025-05-07 RX ORDER — NITROGLYCERIN 0.4 MG/1
0.4 TABLET SUBLINGUAL
Status: DISCONTINUED | OUTPATIENT
Start: 2025-05-07 | End: 2025-05-10 | Stop reason: HOSPADM

## 2025-05-07 RX ORDER — HEPARIN SODIUM 1000 [USP'U]/ML
INJECTION, SOLUTION INTRAVENOUS; SUBCUTANEOUS AS NEEDED
Status: DISCONTINUED | OUTPATIENT
Start: 2025-05-07 | End: 2025-05-07 | Stop reason: SURG

## 2025-05-07 RX ORDER — FAMOTIDINE 10 MG/ML
20 INJECTION, SOLUTION INTRAVENOUS ONCE
Status: DISCONTINUED | OUTPATIENT
Start: 2025-05-07 | End: 2025-05-07

## 2025-05-07 RX ORDER — LIDOCAINE HYDROCHLORIDE 10 MG/ML
0.5 INJECTION, SOLUTION INFILTRATION; PERINEURAL ONCE AS NEEDED
Status: DISCONTINUED | OUTPATIENT
Start: 2025-05-07 | End: 2025-05-07

## 2025-05-07 RX ORDER — DIPHENHYDRAMINE HYDROCHLORIDE 50 MG/ML
25 INJECTION, SOLUTION INTRAMUSCULAR; INTRAVENOUS EVERY 8 HOURS PRN
Status: DISCONTINUED | OUTPATIENT
Start: 2025-05-07 | End: 2025-05-10 | Stop reason: HOSPADM

## 2025-05-07 RX ORDER — EPHEDRINE SULFATE 50 MG/ML
5 INJECTION, SOLUTION INTRAVENOUS ONCE AS NEEDED
Status: DISCONTINUED | OUTPATIENT
Start: 2025-05-07 | End: 2025-05-07 | Stop reason: HOSPADM

## 2025-05-07 RX ORDER — HYDROCODONE BITARTRATE AND ACETAMINOPHEN 5; 325 MG/1; MG/1
1 TABLET ORAL ONCE AS NEEDED
Status: DISCONTINUED | OUTPATIENT
Start: 2025-05-07 | End: 2025-05-07 | Stop reason: SDUPTHER

## 2025-05-07 RX ORDER — PROPOFOL 10 MG/ML
VIAL (ML) INTRAVENOUS AS NEEDED
Status: DISCONTINUED | OUTPATIENT
Start: 2025-05-07 | End: 2025-05-07 | Stop reason: SURG

## 2025-05-07 RX ORDER — HYDROCODONE BITARTRATE AND ACETAMINOPHEN 5; 325 MG/1; MG/1
2 TABLET ORAL EVERY 6 HOURS PRN
Refills: 0 | Status: DISCONTINUED | OUTPATIENT
Start: 2025-05-07 | End: 2025-05-10 | Stop reason: HOSPADM

## 2025-05-07 RX ORDER — HYDRALAZINE HYDROCHLORIDE 20 MG/ML
5 INJECTION INTRAMUSCULAR; INTRAVENOUS
Status: DISCONTINUED | OUTPATIENT
Start: 2025-05-07 | End: 2025-05-07 | Stop reason: HOSPADM

## 2025-05-07 RX ORDER — VERAPAMIL HYDROCHLORIDE 2.5 MG/ML
INJECTION INTRAVENOUS AS NEEDED
Status: COMPLETED | OUTPATIENT
Start: 2025-05-07 | End: 2025-05-07

## 2025-05-07 RX ORDER — ATROPINE SULFATE 0.4 MG/ML
0.4 INJECTION, SOLUTION INTRAMUSCULAR; INTRAVENOUS; SUBCUTANEOUS ONCE AS NEEDED
Status: DISCONTINUED | OUTPATIENT
Start: 2025-05-07 | End: 2025-05-07 | Stop reason: HOSPADM

## 2025-05-07 RX ORDER — IPRATROPIUM BROMIDE AND ALBUTEROL SULFATE 2.5; .5 MG/3ML; MG/3ML
3 SOLUTION RESPIRATORY (INHALATION) ONCE AS NEEDED
Status: DISCONTINUED | OUTPATIENT
Start: 2025-05-07 | End: 2025-05-07 | Stop reason: HOSPADM

## 2025-05-07 RX ORDER — PROCHLORPERAZINE EDISYLATE 5 MG/ML
10 INJECTION INTRAMUSCULAR; INTRAVENOUS EVERY 8 HOURS PRN
Status: COMPLETED | OUTPATIENT
Start: 2025-05-07 | End: 2025-05-09

## 2025-05-07 RX ORDER — SODIUM CHLORIDE 0.9 % (FLUSH) 0.9 %
3 SYRINGE (ML) INJECTION EVERY 12 HOURS SCHEDULED
Status: DISCONTINUED | OUTPATIENT
Start: 2025-05-07 | End: 2025-05-07

## 2025-05-07 RX ORDER — ONDANSETRON 2 MG/ML
4 INJECTION INTRAMUSCULAR; INTRAVENOUS ONCE AS NEEDED
Status: DISCONTINUED | OUTPATIENT
Start: 2025-05-07 | End: 2025-05-07 | Stop reason: HOSPADM

## 2025-05-07 RX ORDER — ONDANSETRON 2 MG/ML
INJECTION INTRAMUSCULAR; INTRAVENOUS AS NEEDED
Status: DISCONTINUED | OUTPATIENT
Start: 2025-05-07 | End: 2025-05-07 | Stop reason: SURG

## 2025-05-07 RX ORDER — EPHEDRINE SULFATE 50 MG/ML
INJECTION, SOLUTION INTRAVENOUS AS NEEDED
Status: DISCONTINUED | OUTPATIENT
Start: 2025-05-07 | End: 2025-05-07 | Stop reason: SURG

## 2025-05-07 RX ORDER — FLUMAZENIL 0.1 MG/ML
0.2 INJECTION INTRAVENOUS AS NEEDED
Status: DISCONTINUED | OUTPATIENT
Start: 2025-05-07 | End: 2025-05-07 | Stop reason: HOSPADM

## 2025-05-07 RX ORDER — SODIUM CHLORIDE, SODIUM LACTATE, POTASSIUM CHLORIDE, CALCIUM CHLORIDE 600; 310; 30; 20 MG/100ML; MG/100ML; MG/100ML; MG/100ML
INJECTION, SOLUTION INTRAVENOUS CONTINUOUS PRN
Status: DISCONTINUED | OUTPATIENT
Start: 2025-05-07 | End: 2025-05-07 | Stop reason: SURG

## 2025-05-07 RX ORDER — SODIUM CHLORIDE 9 MG/ML
100 INJECTION, SOLUTION INTRAVENOUS CONTINUOUS
Status: DISCONTINUED | OUTPATIENT
Start: 2025-05-07 | End: 2025-05-08

## 2025-05-07 RX ORDER — MORPHINE SULFATE 2 MG/ML
2 INJECTION, SOLUTION INTRAMUSCULAR; INTRAVENOUS
Status: DISCONTINUED | OUTPATIENT
Start: 2025-05-07 | End: 2025-05-10 | Stop reason: HOSPADM

## 2025-05-07 RX ORDER — DEXAMETHASONE SODIUM PHOSPHATE 4 MG/ML
INJECTION, SOLUTION INTRA-ARTICULAR; INTRALESIONAL; INTRAMUSCULAR; INTRAVENOUS; SOFT TISSUE AS NEEDED
Status: DISCONTINUED | OUTPATIENT
Start: 2025-05-07 | End: 2025-05-07 | Stop reason: SURG

## 2025-05-07 RX ORDER — ASPIRIN 325 MG
162 TABLET ORAL DAILY
Status: DISCONTINUED | OUTPATIENT
Start: 2025-05-07 | End: 2025-05-07

## 2025-05-07 RX ORDER — MIDAZOLAM HYDROCHLORIDE 1 MG/ML
INJECTION, SOLUTION INTRAMUSCULAR; INTRAVENOUS AS NEEDED
Status: DISCONTINUED | OUTPATIENT
Start: 2025-05-07 | End: 2025-05-07 | Stop reason: SURG

## 2025-05-07 RX ORDER — FENTANYL CITRATE 50 UG/ML
INJECTION, SOLUTION INTRAMUSCULAR; INTRAVENOUS AS NEEDED
Status: DISCONTINUED | OUTPATIENT
Start: 2025-05-07 | End: 2025-05-07 | Stop reason: SURG

## 2025-05-07 RX ORDER — ONDANSETRON 2 MG/ML
4 INJECTION INTRAMUSCULAR; INTRAVENOUS EVERY 6 HOURS PRN
Status: DISCONTINUED | OUTPATIENT
Start: 2025-05-07 | End: 2025-05-07 | Stop reason: SDUPTHER

## 2025-05-07 RX ORDER — SODIUM CHLORIDE 0.9 % (FLUSH) 0.9 %
10 SYRINGE (ML) INJECTION EVERY 12 HOURS SCHEDULED
Status: DISCONTINUED | OUTPATIENT
Start: 2025-05-07 | End: 2025-05-10 | Stop reason: HOSPADM

## 2025-05-07 RX ORDER — PHENYLEPHRINE HYDROCHLORIDE 10 MG/ML
INJECTION INTRAVENOUS AS NEEDED
Status: DISCONTINUED | OUTPATIENT
Start: 2025-05-07 | End: 2025-05-07 | Stop reason: SURG

## 2025-05-07 RX ORDER — SODIUM CHLORIDE 0.9 % (FLUSH) 0.9 %
10 SYRINGE (ML) INJECTION AS NEEDED
Status: DISCONTINUED | OUTPATIENT
Start: 2025-05-07 | End: 2025-05-10 | Stop reason: HOSPADM

## 2025-05-07 RX ORDER — PROMETHAZINE HYDROCHLORIDE 25 MG/1
25 SUPPOSITORY RECTAL ONCE AS NEEDED
Status: DISCONTINUED | OUTPATIENT
Start: 2025-05-07 | End: 2025-05-07 | Stop reason: HOSPADM

## 2025-05-07 RX ORDER — FENTANYL CITRATE 50 UG/ML
50 INJECTION, SOLUTION INTRAMUSCULAR; INTRAVENOUS ONCE AS NEEDED
Status: DISCONTINUED | OUTPATIENT
Start: 2025-05-07 | End: 2025-05-07

## 2025-05-07 RX ORDER — HYDROMORPHONE HYDROCHLORIDE 1 MG/ML
0.5 INJECTION, SOLUTION INTRAMUSCULAR; INTRAVENOUS; SUBCUTANEOUS
Status: DISCONTINUED | OUTPATIENT
Start: 2025-05-07 | End: 2025-05-07 | Stop reason: HOSPADM

## 2025-05-07 RX ORDER — OXYCODONE AND ACETAMINOPHEN 7.5; 325 MG/1; MG/1
1 TABLET ORAL EVERY 4 HOURS PRN
Status: DISCONTINUED | OUTPATIENT
Start: 2025-05-07 | End: 2025-05-07 | Stop reason: HOSPADM

## 2025-05-07 RX ORDER — SODIUM CHLORIDE 0.9 % (FLUSH) 0.9 %
3-10 SYRINGE (ML) INJECTION AS NEEDED
Status: DISCONTINUED | OUTPATIENT
Start: 2025-05-07 | End: 2025-05-07

## 2025-05-07 RX ORDER — CALCIUM CHLORIDE 100 MG/ML
INJECTION INTRAVENOUS; INTRAVENTRICULAR AS NEEDED
Status: DISCONTINUED | OUTPATIENT
Start: 2025-05-07 | End: 2025-05-07 | Stop reason: SURG

## 2025-05-07 RX ORDER — PROMETHAZINE HYDROCHLORIDE 25 MG/1
25 TABLET ORAL ONCE AS NEEDED
Status: DISCONTINUED | OUTPATIENT
Start: 2025-05-07 | End: 2025-05-07 | Stop reason: HOSPADM

## 2025-05-07 RX ORDER — NALOXONE HCL 0.4 MG/ML
0.2 VIAL (ML) INJECTION AS NEEDED
Status: DISCONTINUED | OUTPATIENT
Start: 2025-05-07 | End: 2025-05-07 | Stop reason: HOSPADM

## 2025-05-07 RX ADMIN — HEPARIN SODIUM: 1000 INJECTION INTRAVENOUS; SUBCUTANEOUS at 10:39

## 2025-05-07 RX ADMIN — CEFAZOLIN 2 G: 2 INJECTION, POWDER, LYOPHILIZED, FOR SOLUTION INTRAVENOUS at 10:19

## 2025-05-07 RX ADMIN — HEPARIN SODIUM 3000 UNITS: 1000 INJECTION INTRAVENOUS; SUBCUTANEOUS at 10:47

## 2025-05-07 RX ADMIN — PHENYLEPHRINE HYDROCHLORIDE 100 MCG: 10 INJECTION INTRAVENOUS at 11:15

## 2025-05-07 RX ADMIN — MIDAZOLAM HYDROCHLORIDE 2 MG: 1 INJECTION, SOLUTION INTRAMUSCULAR; INTRAVENOUS at 10:17

## 2025-05-07 RX ADMIN — SODIUM CHLORIDE, POTASSIUM CHLORIDE, SODIUM LACTATE AND CALCIUM CHLORIDE: 600; 310; 30; 20 INJECTION, SOLUTION INTRAVENOUS at 10:11

## 2025-05-07 RX ADMIN — SUGAMMADEX 200 MG: 100 INJECTION, SOLUTION INTRAVENOUS at 11:49

## 2025-05-07 RX ADMIN — ROCURONIUM BROMIDE 50 MG: 10 INJECTION INTRAVENOUS at 10:19

## 2025-05-07 RX ADMIN — LIDOCAINE HYDROCHLORIDE 5 ML: 20 INJECTION, SOLUTION INFILTRATION; PERINEURAL at 10:17

## 2025-05-07 RX ADMIN — Medication 10 ML: at 21:00

## 2025-05-07 RX ADMIN — Medication 10 ML: at 08:59

## 2025-05-07 RX ADMIN — TICAGRELOR 180 MG: 90 TABLET ORAL at 09:38

## 2025-05-07 RX ADMIN — CALCIUM CHLORIDE 0.2 G: 100 INJECTION, SOLUTION INTRAVENOUS at 11:39

## 2025-05-07 RX ADMIN — HEPARIN SODIUM: 1000 INJECTION INTRAVENOUS; SUBCUTANEOUS at 10:37

## 2025-05-07 RX ADMIN — Medication 10 ML: at 01:17

## 2025-05-07 RX ADMIN — SODIUM CHLORIDE 100 ML/HR: 9 INJECTION, SOLUTION INTRAVENOUS at 16:20

## 2025-05-07 RX ADMIN — CALCIUM CHLORIDE 0.2 G: 100 INJECTION, SOLUTION INTRAVENOUS at 11:31

## 2025-05-07 RX ADMIN — DEXAMETHASONE SODIUM PHOSPHATE 8 MG: 4 INJECTION, SOLUTION INTRA-ARTICULAR; INTRALESIONAL; INTRAMUSCULAR; INTRAVENOUS; SOFT TISSUE at 10:34

## 2025-05-07 RX ADMIN — FENTANYL CITRATE 50 MCG: 50 INJECTION, SOLUTION INTRAMUSCULAR; INTRAVENOUS at 10:17

## 2025-05-07 RX ADMIN — PROPOFOL 200 MG: 10 INJECTION, EMULSION INTRAVENOUS at 10:17

## 2025-05-07 RX ADMIN — ROCURONIUM BROMIDE 20 MG: 10 INJECTION INTRAVENOUS at 10:44

## 2025-05-07 RX ADMIN — CALCIUM CHLORIDE 0.2 G: 100 INJECTION, SOLUTION INTRAVENOUS at 11:35

## 2025-05-07 RX ADMIN — VERAPAMIL HYDROCHLORIDE 5 MG: 2.5 INJECTION INTRAVENOUS at 11:29

## 2025-05-07 RX ADMIN — HEPARIN SODIUM 1000 UNITS: 1000 INJECTION INTRAVENOUS; SUBCUTANEOUS at 11:15

## 2025-05-07 RX ADMIN — IODIXANOL 128 ML: 320 INJECTION, SOLUTION INTRAVASCULAR at 12:00

## 2025-05-07 RX ADMIN — HEPARIN SODIUM: 1000 INJECTION INTRAVENOUS; SUBCUTANEOUS at 10:38

## 2025-05-07 RX ADMIN — EPHEDRINE SULFATE 5 MG: 50 INJECTION INTRAVENOUS at 11:39

## 2025-05-07 RX ADMIN — HYDROMORPHONE HYDROCHLORIDE 0.25 MG: 1 INJECTION, SOLUTION INTRAMUSCULAR; INTRAVENOUS; SUBCUTANEOUS at 13:19

## 2025-05-07 RX ADMIN — PHENYLEPHRINE HYDROCHLORIDE 100 MCG: 10 INJECTION INTRAVENOUS at 11:31

## 2025-05-07 RX ADMIN — CALCIUM CHLORIDE 0.2 G: 100 INJECTION, SOLUTION INTRAVENOUS at 11:48

## 2025-05-07 RX ADMIN — PHENYLEPHRINE HYDROCHLORIDE 100 MCG: 10 INJECTION INTRAVENOUS at 11:47

## 2025-05-07 RX ADMIN — ASPIRIN 81 MG: 81 TABLET, COATED ORAL at 09:38

## 2025-05-07 RX ADMIN — ONDANSETRON 4 MG: 2 INJECTION, SOLUTION INTRAMUSCULAR; INTRAVENOUS at 10:34

## 2025-05-07 RX ADMIN — FENTANYL CITRATE 50 MCG: 50 INJECTION, SOLUTION INTRAMUSCULAR; INTRAVENOUS at 11:44

## 2025-05-07 RX ADMIN — PHENYLEPHRINE HYDROCHLORIDE 50 MCG: 10 INJECTION INTRAVENOUS at 11:08

## 2025-05-07 NOTE — NURSING NOTE
Transported to CT scan with monitor and second nurse   returned to PACU pt tolerated CT scan well

## 2025-05-07 NOTE — ANESTHESIA POSTPROCEDURE EVALUATION
Patient: Ekaterina Ortez    Procedure Summary       Date: 05/07/25 Room / Location: [facility] INTERVENTIONAL    Anesthesia Start: 1011 Anesthesia Stop: 1210    Procedure: IR EMBOLIZATION Diagnosis: (poss aneurysm rupture)    Scheduled Providers: Zacarias Billy MD Provider: Skyler Contreras MD    Anesthesia Type: general ASA Status: 2 - Emergent            Anesthesia Type: general    Vitals  Vitals Value Taken Time   /70 05/07/25 14:15   Temp 37.4 °C (99.4 °F) 05/07/25 14:03   Pulse 110 05/07/25 14:15   Resp 16 05/07/25 12:45   SpO2 98 % 05/07/25 14:15   Vitals shown include unfiled device data.        Post Anesthesia Care and Evaluation    Patient location during evaluation: bedside  Pain management: adequate    Airway patency: patent  Anesthetic complications: No anesthetic complications    Cardiovascular status: acceptable  Respiratory status: acceptable  Hydration status: acceptable

## 2025-05-07 NOTE — ANESTHESIA PROCEDURE NOTES
Airway  Reason: elective    Date/Time: 5/7/2025 10:22 AM  Airway not difficult    General Information and Staff    Patient location during procedure: OR  Anesthesiologist: Skyler Contreras MD  CRNA/CAA: Maria Alejandra Fletcher CRNA    Indications and Patient Condition  Indications for airway management: airway protection    Preoxygenated: yes  MILS maintained throughout    Mask difficulty assessment: 1 - vent by mask    Final Airway Details    Final airway type: endotracheal airway      Successful airway: ETT  Cuffed: yes   Successful intubation technique: direct laryngoscopy  Adjuncts used in placement: intubating stylet and cricoid pressure  Endotracheal tube insertion site: oral  Blade: Nataliia  Blade size: 3  ETT size (mm): 7.0  Cormack-Lehane Classification: grade IIb - view of arytenoids or posterior of glottis only  Placement verified by: chest auscultation and capnometry   Inital cuff pressure (cm H2O): 22  Measured from: gums  Number of attempts at approach: 2  Assessment: lips, teeth, and gum same as pre-op and atraumatic intubation    Additional Comments  Anterior, difficulty obtaining view. Atraumatic intubation.

## 2025-05-07 NOTE — ANESTHESIA PREPROCEDURE EVALUATION
Anesthesia Evaluation     Patient summary reviewed and Nursing notes reviewed   NPO Solid Status: > 8 hours  NPO Liquid Status: > 2 hours           Airway   Mallampati: II  TM distance: >3 FB  Neck ROM: full  Dental - normal exam     Pulmonary - negative pulmonary ROS   (-) decreased breath sounds, wheezes  Cardiovascular - normal exam  Exercise tolerance: good (4-7 METS)    (-) hypertension      Neuro/Psych  (+) headaches  (-) seizures, CVA  GI/Hepatic/Renal/Endo - negative ROS   (-) diabetes    Musculoskeletal (-) negative ROS    Abdominal    Substance History - negative use  (-) alcohol use, drug use     OB/GYN negative ob/gyn ROS         Other - negative ROS       ROS/Med Hx Other: Headache  -Neurochecks every 4 hours   -Vital signs every 4 hours   -Cardiac monitoring   -CT Head-negative  -CTA Head/neck -0% stenosis of both internal carotid arteries both vertebral arteries are patent throughout the neck.  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  No other aneurysm seen.  -PT to eval and treat  - Interventional neurosurgery consult in a.m.                   Anesthesia Plan    ASA 2 - emergent     general     intravenous induction     Anesthetic plan, risks, benefits, and alternatives have been provided, discussed and informed consent has been obtained with: patient.    CODE STATUS:    Code Status (Patient has no pulse and is not breathing): CPR (Attempt to Resuscitate)  Medical Interventions (Patient has pulse or is breathing): Full Support

## 2025-05-07 NOTE — PLAN OF CARE
Goal Outcome Evaluation:      Pt. Currently denies any headaches since receiving medication in the ER. NPO for neurology and neurosurgery consult.

## 2025-05-07 NOTE — PLAN OF CARE
Goal Outcome Evaluation:           Progress: improving  Outcome Evaluation: New from PACU WEB embolization of 7mm paraclinoid aneurysm. A&Ox4. R groin site soft not swollen. VSS. CT in AM

## 2025-05-07 NOTE — NURSING NOTE
Call to dr blandon for icu orders informed about  persistent tachycardia pt co head ache bp 112/67 will place orders ok for pt to have something by mouth

## 2025-05-07 NOTE — H&P
[facility]  [phone]  Dr. Manoj Lira      Subjective   LOS: 0 days     I was not aware of this patient's admission to me until she arrived in the ICU.  45-year-old male who presented to the ED with headache that started suddenly 2 days ago.  Cerebral angiogram showed a left-sided ophthalmic artery and patient is now status post web embolization of the same.  She feels a little drowsy from all the medications but otherwise denies any new complaints.  She does not have any new deficits.  Denies underlying hypertension or other medical problems except for endometriosis.  She is a non-smoker.    Ekaterina Ortez  reports no history of alcohol use.,  reports that she has never smoked. She has never used smokeless tobacco.     Past Hx:  has a past medical history of Endometriosis and Migraine.  Surg Hx:  has a past surgical history that includes  section and Diagnostic laparoscopy.  FH: family history is not on file.  SH:  reports that she has never smoked. She has never used smokeless tobacco. She reports that she does not drink alcohol and does not use drugs.    No medications prior to admission.     Allergies   Allergen Reactions    Penicillins Anaphylaxis    Keflex [Cephalexin] Rash       Review of Systems   Constitutional:  Negative for chills and fever.   HENT:  Negative for congestion and sore throat.    Respiratory:  Negative for shortness of breath and wheezing.    Cardiovascular:  Negative for chest pain and leg swelling.   Gastrointestinal:  Negative for abdominal pain, nausea and vomiting.   Genitourinary:  Negative for dysuria and hematuria.   Musculoskeletal:  Negative for arthralgias and back pain.   Skin:  Negative for pallor and rash.   Neurological:  Positive for headaches. Negative for seizures.   Psychiatric/Behavioral:  Negative for agitation and confusion.        Vital Signs past 24hrs  BP range: BP: (103-133)/(54-84) 120/70  Pulse range: Heart Rate:  [] 127  Resp  rate range: Resp:  [16-18] 16  Temp range: Temp (24hrs), Av.1 °F (36.7 °C), Min:96.1 °F (35.6 °C), Max:99.4 °F (37.4 °C)    Oxygen range: SpO2:  [93 %-100 %] 98 %;  ;   Device (Oxygen Therapy): room air  59.3 kg (130 lb 11.7 oz); Body mass index is 21.1 kg/m².  Net IO Since Admission: 700 mL [25 1530]      Mechanical Ventilator:     Adult female sitting up in bed.  No acute distress.  Pupils equal and react light.  Oropharynx somewhat dry.  JVP not elevated.  Lungs reveal bilateral air entry clear to auscultation.  Heart examination S1-S2 present rhythm regular mildly tachycardic.  No murmurs.  Extremities no edema.  Abdomen is soft nontender bowel sounds present no liver spleen enlargement.  No peripheral cyanosis clubbing.  Moves all 4 extremities sensorimotor intact.  No cervical, axillary, inguinal adenopathy.    Results Review:    I have reviewed the laboratory and imaging data from current admission. My annotations are as noted in assessment and plan.  Result Review:  I have personally reviewed the results from the time of this admission to 2025 15:30 EDT and agree with these findings:  [x]  Laboratory list / accordion  [x]  Microbiology  [x]  Radiology  []  EKG/Telemetry   []  Cardiology/Vascular   []  Pathology  []  Old records  []  Other:        Medication Review:  I have reviewed the current MAR. My annotations are as noted in assessment and plan.    lactated ringers, 9 mL/hr  niCARdipine, 5-15 mg/hr      Lines, Drains & Airways       Active LDAs       Name Placement date Placement time Site Days    Peripheral IV 25 20 G Left Antecubital 25  Antecubital  less than 1    Urethral Catheter Temperature probe 16 Fr. 25  1024  -- less than 1                  Isolation status: No active isolations    Dietary Orders (From admission, onward)       Start     Ordered    25 1457  Diet: Regular/House; Fluid Consistency: Thin (IDDSI 0)  Diet Effective Now         References:    Diet Order Definitions   Question Answer Comment   Diets: Regular/House    Fluid Consistency: Thin (IDDSI 0)        05/07/25 1456                    Isolation:  No active isolations    PCCM Problems  Headache with left ophthalmic artery aneurysm, now status post web embolization on 5/7/2025  History endometriosis  Tachycardia    Plan of Treatment  Neurochecks per protocol.  Monitor blood pressure.  Neurosurgery will follow. On aspirin.    Tachycardia noted.  Will give fluids for now.  Check TSH in the morning.     Note mild anemia. Check iron profile.    Electronically signed by Manoj Lira MD, 05/07/25, 3:30 PM EDT.      Part of this note may be an electronic transcription/translation of spoken language to printed text using the Dragon Dictation System.

## 2025-05-07 NOTE — CASE MANAGEMENT/SOCIAL WORK
Continued Stay Note  [facility]     Patient Name: Ekaterina Ortez  MRN: [id number]  Today's Date: 5/7/2025    Admit Date: 5/6/2025        Discharge Plan       Row Name 05/07/25 1422       Plan    Plan Comments Screen unable to be completed, as pt remains still off the floor & has orders to transfer to ICU. CCP notes pt has been assigned a bed. Full screen needed. CCP in ICU will now follow. DC barriers: IV benadryl, IR embolization scheduled 5/7, neurology consulted 5/6, & neurosurgery consulted 5/7. DORA Neumann/CCP      Row Name 05/07/25 5817       Plan    Plan Comments CCP attempted to screen pt, however, pt off the floor. Full screen needed; CCP following. DC barriers: IV benadryl, IR embolization scheduled 5/7, neurology consulted 5/6, & neurosurgery consulted 5/7. DORA Neumann/SABRINA Beltran RN

## 2025-05-07 NOTE — H&P
[facility]   HISTORY AND PHYSICAL    Patient Name: Ekaterina Ortez  : 1980  MRN: [id number]  Primary Care Physician:  Chirag, No Known  Date of admission: 2025    Patient Care Team:  Provider, No Known as PCP - General       Subjective   Subjective     Chief Complaint:   Chief Complaint   Patient presents with    Headache         HPI:    Ekaterina Ortez is a 45 y.o. female, with past medical history of migraine headaches, was admitted to the observation unit with a complaint of a headache.  She states that she has had a left occipital headache for approximately 1 month.  She describes the pain as a sharp and stabbing pain.  She states that  she has not been pain-free in over 1 month.  At the pains best it feels like a dull ache.  She states that it always stays in the left occipital region.  The pain is not relieved by her Imitrex that she takes for her migraine headaches.  Over the past 48 hours the headache has become much worse which prompted her to come to the emergency department tonight.  She denies any visual disturbances, difficulty with speech, numbness, tingling, paresthesias of her extremities.  Interventional neurosurgery has been consulted to see the patient in the AM.  She will remain n.p.o. after midnight.    Review of Systems   All systems were reviewed and negative except for: What was mentioned above in the HPI.    Personal History     Past Medical History:   Diagnosis Date    Endometriosis     Migraine        Past Surgical History:   Procedure Laterality Date     SECTION      x 3    DIAGNOSTIC LAPAROSCOPY      x 2       Family History: family history is not on file. Otherwise pertinent FHx was reviewed and not pertinent to current issue.    Social History:  reports that she has never smoked. She has never used smokeless tobacco. She reports that she does not drink alcohol and does not use drugs.    Home Medications:  SUMAtriptan    Allergies:  Allergies   Allergen  Reactions    Penicillins Anaphylaxis    Keflex [Cephalexin] Rash       Objective   Objective     Vitals:   Temp:  [96.1 °F (35.6 °C)] 96.1 °F (35.6 °C)  Heart Rate:  [77-88] 77  Resp:  [16] 16  BP: (105-133)/(65-84) 109/69  Physical Exam    Constitutional: Awake, alert   Eyes: PERRLA, sclerae anicteric, no conjunctival injection   HENT: NCAT, mucous membranes moist   Neck: Supple, no thyromegaly, no lymphadenopathy, trachea midline   Respiratory: Clear to auscultation bilaterally, nonlabored respirations    Cardiovascular: RRR, no murmurs, rubs, or gallops, palpable pedal pulses bilaterally   Gastrointestinal: Positive bowel sounds, soft, nontender, nondistended   Musculoskeletal: No bilateral ankle edema, no clubbing or cyanosis to extremities   Psychiatric: Appropriate affect, cooperative   Neurologic: Oriented x 3, strength symmetric in all extremities, Cranial Nerves grossly intact to confrontation, speech clear   Skin: No rashes     Result Review    Result Review:  I have personally reviewed the results from the time of this admission to 5/7/2025 00:33 EDT and agree with these findings:  [x]  Laboratory list / accordion  []  Microbiology  [x]  Radiology  []  EKG/Telemetry   []  Cardiology/Vascular   []  Pathology  [x]  Old records  []  Other:    Lab work done in the emergency department is unremarkable.  CT head is negative.  CTA head and neck is negative other than a 7 mm left paraclinoid ICA aneurysm.      The ASCVD Risk score (Bobbi DK, et al., 2019) failed to calculate for the following reasons:    Cannot find a previous HDL lab    Cannot find a previous total cholesterol lab     Assessment & Plan   Assessment / Plan     Brief Patient Summary:  Ekaterina Ortez is a 45 y.o. female who was admitted to the observation unit for further evaluation and treatment of her headache and 7 mm left paraclinoid ICA aneurysm.    Active Hospital Problems:  Active Hospital Problems    Diagnosis     **Headache      Plan:      Headache  -Neurochecks every 4 hours   -Vital signs every 4 hours   -Cardiac monitoring   -CT Head-negative  -CTA Head/neck -0% stenosis of both internal carotid arteries both vertebral arteries are patent throughout the neck.  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  No other aneurysm seen.  -PT to eval and treat  - Interventional neurosurgery consult in a.m.    VTE Prophylaxis:  Mechanical VTE prophylaxis orders are present.        CODE STATUS:    Code Status (Patient has no pulse and is not breathing): CPR (Attempt to Resuscitate)  Medical Interventions (Patient has pulse or is breathing): Full Support    Admission Status:  I believe this patient meets observation status.    76 minutes have been spent by [facility] providers in the care of this patient while under observation status on 05/07/25 .      Appropriate PPE worn during patient encounter.  Hand hygeine performed before and after seeing the patient.      Electronically signed by SHERWIN Alford, 05/07/25, 12:33 AM EDT.

## 2025-05-07 NOTE — CASE MANAGEMENT/SOCIAL WORK
Continued Stay Note  [facility]     Patient Name: Ekaterina Ortez  MRN: [id number]  Today's Date: 5/7/2025    Admit Date: 5/6/2025        Discharge Plan       Row Name 05/07/25 1251       Plan    Plan Comments CCP attempted to screen pt, however, pt off the floor. Full screen needed; CCP following. DC barriers: IV benadryl, IR embolization scheduled 5/7, neurology consulted 5/6, & neurosurgery consulted 5/7. DORA Neumann/SABRINA Beltran RN

## 2025-05-07 NOTE — ED PROVIDER NOTES
EMERGENCY DEPARTMENT ENCOUNTER    Room Number:  120/1  Date of encounter:  5/7/2025  PCP: Provider, No Known  Historian: Patient and spouse  Relevant information and history provided by sources other than the patient will be included below and in the ED Course.  Review of pertinent past medical records may also be included in record below and ED Course.    HPI:  Chief Complaint: Headache  A complete HPI/ROS/PMH/PSH/SH/FH are unobtainable due to: Not applicable  Context: Ekaterina Ortez is a 45 y.o. female who presents to the ED c/o this patient has had a headache that started 1 week before Easter.  So it started somewhere around April 12 or 13.  She is not quite certain of the exact day this headache started.  It was not abrupt or severe in onset.  It is mainly located in her left occipital scalp area.  But has tenderness and palpation to her left occipital scalp and seems to have a trigger point at the base where the skull and the soft tissue of the neck is posteriorly and laterally on the left.  She also has a sensitivity at the vertex of her scalp just left of midline.  She feels as if there is a burning sensation at times.  This headache has been constant and is never been relieved.  She has a history of hormonal migraines and initially she thought that it potentially could been of that but it is persisted.  Usually her hormonal migraines are associated with her menses.  She also states the pain is worse at night it is worse when she is lying down and when she gets up in the morning.  It is better when she is up and standing and moving throughout the day.  She has tried Tylenol and Motrin with some transient mild improvement.  She has never had a complete resolution of her headache.  She also tried Imitrex with no relief.  She feels like the symptoms are getting worse the past 2 to 3 days.  She has no vision change speech change or any focal weakness to arms or legs.  She feels that she cannot concentrate on  reading because of the discomfort and the pain that she feels.  She has had no fevers chills coughs or colds.  She has had no nausea vomiting or diarrhea.  Never had a headache quite like this before this been this persistent.  No history of any clotting disorder and has never had any sort of blood clots even during pregnancy.  She also thought potentially it could be due to allergies because she has a swishing sense in her left ear.  She has tried allergy medicines with no relief as well.  She has not seen any physician for this.  She has had no trauma or injury to her head.  I saw her in February after she hit her head and she states that that headache resolved and this headaches is different        Previous Episodes: No not as persistent  Current Symptoms: See above    MEDICAL HISTORY REVIEWED  Follow-up I can see this patient was seen by me 2025 with a diagnosis of concussion without loss of consciousness.  She had an acute head injury when the trunk of her car fell while she was working on the car and hit her head on the crown of her head she was dazed but did not lose consciousness.  And nauseated she did have a CT scan of the head which showed no acute abnormality.  I did review that report.      PAST MEDICAL HISTORY  Active Ambulatory Problems     Diagnosis Date Noted    Relies on partner vasectomy for contraception 2024     Resolved Ambulatory Problems     Diagnosis Date Noted    No Resolved Ambulatory Problems     Past Medical History:   Diagnosis Date    Endometriosis     Migraine          PAST SURGICAL HISTORY  Past Surgical History:   Procedure Laterality Date     SECTION      x 3    DIAGNOSTIC LAPAROSCOPY      x 2         FAMILY HISTORY  Family History   Problem Relation Age of Onset    Uterine cancer Neg Hx     Colon cancer Neg Hx     Ovarian cancer Neg Hx     Breast cancer Neg Hx          SOCIAL HISTORY  Social History     Socioeconomic History    Marital status:     Tobacco Use    Smoking status: Never    Smokeless tobacco: Never   Vaping Use    Vaping status: Never Used   Substance and Sexual Activity    Alcohol use: Never    Drug use: Never    Sexual activity: Yes     Partners: Male     Birth control/protection: Vasectomy         ALLERGIES  Penicillins and Keflex [cephalexin]        REVIEW OF SYSTEMS  Review of Systems     All systems reviewed and negative except for those discussed in HPI.       PHYSICAL EXAM    I have reviewed the triage vital signs and nursing notes.    ED Triage Vitals   Temp Heart Rate Resp BP SpO2   05/06/25 2022 05/06/25 2022 05/06/25 2022 05/06/25 2029 05/06/25 2022   96.1 °F (35.6 °C) 88 16 124/84 98 %      Temp src Heart Rate Source Patient Position BP Location FiO2 (%)   -- -- -- -- --              GENERAL: Anxious female.  No acute distress.Vital signs on my initial evaluation unremarkable.  Blood pressure heart rate is normal.  She is afebrile.  HENT: nares patent  Head/neck/ face are symmetric without gross deformity, signs of trauma, or swelling normal inspection to her head and scalp diffusely.  She does have area of reproducible discomfort and palpation following the greater exceptional nerve on the left and she seems to have a very focal trigger point right below the occipital scalp on the left as well.  EYES: no scleral icterus, no conjunctival pallor.  Pupils equal round reactive to light extraocular muscles are intact.  No vision impairment.  No photophobia.  NECK: Supple, no meningismus  CV: regular rhythm, regular rate with intact distal pulses.  RESPIRATORY: normal effort and no respiratory distress.  Clear to auscultation bilaterally  ABDOMEN: soft and nontender.  MUSCULOSKELETAL: no deformity.  Intact distal pulses that are equal strong and symmetric.   NEURO: alert and appropriate, moves all extremities, follows commands.  No focal motor or sensory changes.  SKIN: warm, dry    Vital signs and nursing notes reviewed.        LAB  RESULTS  Recent Results (from the past 24 hours)   Protime-INR    Collection Time: 05/06/25  9:20 PM    Specimen: Blood   Result Value Ref Range    Protime 13.2 11.7 - 14.2 Seconds    INR 1.01 0.90 - 1.10   Comprehensive Metabolic Panel    Collection Time: 05/06/25  9:20 PM    Specimen: Blood   Result Value Ref Range    Glucose 97 65 - 99 mg/dL    BUN 10 6 - 20 mg/dL    Creatinine 0.65 0.57 - 1.00 mg/dL    Sodium 140 136 - 145 mmol/L    Potassium 3.6 3.5 - 5.2 mmol/L    Chloride 106 98 - 107 mmol/L    CO2 25.0 22.0 - 29.0 mmol/L    Calcium 9.0 8.6 - 10.5 mg/dL    Total Protein 6.8 6.0 - 8.5 g/dL    Albumin 4.5 3.5 - 5.2 g/dL    ALT (SGPT) 15 1 - 33 U/L    AST (SGOT) 16 1 - 32 U/L    Alkaline Phosphatase 41 39 - 117 U/L    Total Bilirubin 0.3 0.0 - 1.2 mg/dL    Globulin 2.3 gm/dL    A/G Ratio 2.0 g/dL    BUN/Creatinine Ratio 15.4 7.0 - 25.0    Anion Gap 9.0 5.0 - 15.0 mmol/L    eGFR 111.5 >60.0 mL/min/1.73   CBC Auto Differential    Collection Time: 05/06/25  9:20 PM    Specimen: Blood   Result Value Ref Range    WBC 5.59 3.40 - 10.80 10*3/mm3    RBC 3.87 3.77 - 5.28 10*6/mm3    Hemoglobin 12.0 12.0 - 15.9 g/dL    Hematocrit 35.7 34.0 - 46.6 %    MCV 92.2 79.0 - 97.0 fL    MCH 31.0 26.6 - 33.0 pg    MCHC 33.6 31.5 - 35.7 g/dL    RDW 11.5 (L) 12.3 - 15.4 %    RDW-SD 38.3 37.0 - 54.0 fl    MPV 10.2 6.0 - 12.0 fL    Platelets 280 140 - 450 10*3/mm3    Neutrophil % 58.3 42.7 - 76.0 %    Lymphocyte % 32.9 19.6 - 45.3 %    Monocyte % 6.6 5.0 - 12.0 %    Eosinophil % 1.1 0.3 - 6.2 %    Basophil % 0.9 0.0 - 1.5 %    Immature Grans % 0.2 0.0 - 0.5 %    Neutrophils, Absolute 3.26 1.70 - 7.00 10*3/mm3    Lymphocytes, Absolute 1.84 0.70 - 3.10 10*3/mm3    Monocytes, Absolute 0.37 0.10 - 0.90 10*3/mm3    Eosinophils, Absolute 0.06 0.00 - 0.40 10*3/mm3    Basophils, Absolute 0.05 0.00 - 0.20 10*3/mm3    Immature Grans, Absolute 0.01 0.00 - 0.05 10*3/mm3    nRBC 0.0 0.0 - 0.2 /100 WBC       Ordered the above labs and independently  reviewed the results.        RADIOLOGY  CT Angiogram Head  CT Angiogram Head, CT Angiogram Carotids  Result Date: 5/6/2025  HEAD CT WITHOUT CONTRAST, HEAD & NECK CTA WITH CONTRAST  INDICATION: Worsening headache.  TECHNIQUE: Axial images were acquired from the skull base to vertex without contrast, including multiplanar reformats, per standard departmental protocol , followed by CT angiogram of the head and neck with IV contrast. 3-D postprocessing was performed and reviewed.  Evaluation for a significant carotid arterial stenosis is based on the NASCET criteria.  Radiation dose reduction techniques were utilized, including automated exposure control, and exposure modulation based on body size.  COMPARISON: None available.  FINDINGS:  Head CT: There is no CT evidence of acute intracranial hemorrhage, mass, or infarct. There is no hydrocephalus or extra-axial fluid collection. Brain parenchymal density is within normal limits.  CTA neck: There is a normal aortic arch branching pattern without proximal great vessel stenosis. Both vertebral arteries are patent throughout the neck, and supply the basilar artery.  Both common carotid arteries are normally patent. There is 0% stenosis in both internal carotid arteries.  CTA head:  There is symmetric distal intracranial runoff in the anterior, middle, and posterior cerebral artery territories.  There is no evidence of high-grade intracranial flow-limiting stenosis or branch vessel occlusion. There is a left ICA 7 mm paraclinoid aneurysm. No other aneurysm is seen. The dural venous sinuses appear normal.  Extravascular structures: There is no intracranial mass or abnormal enhancement.   The extracranial and cervical soft tissue structures show no acute abnormality.  The visualized lung apices show no acute abnormality.  There is no acute bony abnormality.       Normal negative unenhanced head CT.  There is 0% stenosis in both internal carotid arteries. Both vertebral  arteries are patent throughout the neck.  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 No other aneurysm is seen.  This report was finalized on 5/6/2025 10:39 PM by Dr. Arturo Zuluaga M.D on Workstation: [id number]        I ordered the above noted radiological studies. Reviewed by me and discussed with radiologist.  See dictation for official radiology interpretation.      PROCEDURES    Procedures      MEDICATIONS GIVEN IN ER    Medications   sodium chloride 0.9 % flush 10 mL (has no administration in time range)   diphenhydrAMINE (BENADRYL) injection 25 mg (0 mg Intravenous Hold 5/6/25 2127)   sodium chloride 0.9 % flush 10 mL (10 mL Intravenous Given 5/7/25 0117)   sodium chloride 0.9 % flush 10 mL (has no administration in time range)   sodium chloride 0.9 % infusion 40 mL (has no administration in time range)   ondansetron ODT (ZOFRAN-ODT) disintegrating tablet 4 mg (has no administration in time range)     Or   ondansetron (ZOFRAN) injection 4 mg (has no administration in time range)   nitroglycerin (NITROSTAT) SL tablet 0.4 mg (has no administration in time range)   Potassium Replacement - Follow Nurse / BPA Driven Protocol (has no administration in time range)   Magnesium Standard Dose Replacement - Follow Nurse / BPA Driven Protocol (has no administration in time range)   Phosphorus Replacement - Follow Nurse / BPA Driven Protocol (has no administration in time range)   Calcium Replacement - Follow Nurse / BPA Driven Protocol (has no administration in time range)   prochlorperazine (COMPAZINE) injection 10 mg (10 mg Intravenous Given 5/6/25 2129)   sodium chloride 0.9 % bolus 1,000 mL (0 mL Intravenous Stopped 5/6/25 2241)   iopamidol (ISOVUE-370) 76 % injection 100 mL (95 mL Intravenous Given by Other 5/6/25 2153)         All labs have been independently reviewed by me.  All radiology studies have been  reviewed by me and I discussed with radiologist dictating the report when indicated below.  All EKG's independently viewed and interpreted by me.  Discussion below represents my analysis of pertinent findings related to patient's condition, differential diagnosis, treatment plan and final disposition.        PROGRESS, DATA ANALYSIS, CONSULTS, AND MEDICAL DECISION MAKING    This is a patient that presents with a headache this been going on for about 1 month.  Has been constant.  Seems to be in the left side of her scalp.  Potentially could be greater occipital neuralgia but at the same time it is worse when she is at night and she is laying down.  She has no history of clotting disorder.  I am going to do a CT of the head and CT venogram.  Will get a go ahead and give her some Compazine and Benadryl for the headache.  Informed her and the spouse of my clinical concerns and assessment.  All questions answered at this time.      ED Course as of 05/07/25 0200   Tue May 06, 2025   2339 Normal negative unenhanced head CT CT angiogram is unremarkable there is a 7 mm left paraclinoid aneurysm no other aneurysms noted.  No limited flow.  The dural venous sinuses also appear normal.  I reviewed the dictated report from radiologist.  Please see complete dictated report [MM]   2345 I have talked with the patient as well as spouse at length informed about the results of the CT scan.  Patient's headache has resolved.  I am going to put a call out to neurosurgery given the small ICH aneurysm.  All questions answered at this time [MM]   Wed May 07, 2025   0019 I did discuss with case with Dr. Landaverde who is on for neurosurgery.  He recommends that we bring her into the observation unit have one of the the interventional neurosurgeon see her tomorrow to get the process rolling.  Otherwise this can be harder and more difficult to get her in as an outpatient. [MM]   0025 Informed patient and spouse my conversation with the neurosurgeon  and the plan.  She agrees with that plan.  All questions answered [MM]   0028 Prior to getting the CT scan results I did discuss the case with Dr. Derek Lopez from neurology.  Informing him of my assessment that I believe she has greater occipital neuralgia.  He would see the patient as outpatient if I got the patient pain under control.  Also see the patient if need be if we had to admit to the observation unit if there was an abnormality in the CT scan. [MM]   0029 I did discuss the case with Radha Jerez who is the midlevel provider in the observation unit.  Informed her of the patient's presenting symptoms as well as my conversation with the neurologist and neurosurgery.  She agrees to admit.  All questions answered [MM]      ED Course User Index  [MM] Hernan Barker MD       AS OF 02:00 EDT VITALS:    BP - 113/54  HR - 95  TEMP - 98.4 °F (36.9 °C) (Oral)  02 SATS - 98%    SOCIAL DETERMINANTS OF HEALTH THAT IMPACT OR LIMIT CARE (For example..Homelessness,safe discharge, inability to obtain care, follow up, or prescriptions):      DIAGNOSIS  Final diagnoses:   Acute nonintractable headache, unspecified headache type   Cerebral aneurysm   Occipital neuralgia of left side         DISPOSITION  Patient admitted to the observation unit.          DICTATED UTILIZING DRAGON DICTATION    Note Disclaimer: At [facility], we believe that sharing information builds trust and better relationships. You are receiving this note because you recently visited [facility]. It is possible you will see health information before a provider has talked with you about it. This kind of information can be easy to misunderstand. To help you fully understand what it means for your health, we urge you to discuss this note with your provider.       Hernan Barker MD  05/07/25 0988

## 2025-05-07 NOTE — NURSING NOTE
Nursing report ED to floor  Ekaterina Ortez  45 y.o.  female    HPI :  HPI  Stated Reason for Visit: headache  History Obtained From: patient    Chief Complaint  Chief Complaint   Patient presents with    Headache       Admitting doctor:   No admitting provider for patient encounter.    Admitting diagnosis:   The primary encounter diagnosis was Acute nonintractable headache, unspecified headache type. Diagnoses of Cerebral aneurysm and Occipital neuralgia of left side were also pertinent to this visit.    Code status:   Current Code Status       Date Active Code Status Order ID Comments User Context       Not on file            Allergies:   Penicillins and Keflex [cephalexin]    Isolation:   No active isolations    Intake and Output    Intake/Output Summary (Last 24 hours) at 5/7/2025 0027  Last data filed at 5/6/2025 2241  Gross per 24 hour   Intake 1000 ml   Output --   Net 1000 ml       Weight:       05/06/25 2024   Weight: 56.2 kg (124 lb)       Most recent vitals:   Vitals:    05/06/25 2024 05/06/25 2029 05/06/25 2105 05/06/25 2226   BP:  124/84 133/80 108/65   Pulse:   88 79   Resp:       Temp:       SpO2:   99% 96%   Weight: 56.2 kg (124 lb)      Height:           Active LDAs/IV Access:   Lines, Drains & Airways       Active LDAs       Name Placement date Placement time Site Days    Peripheral IV 05/06/25 2120 20 G Left Antecubital 05/06/25 2120  Antecubital  less than 1                    Labs (abnormal labs have a star):   Labs Reviewed   CBC WITH AUTO DIFFERENTIAL - Abnormal; Notable for the following components:       Result Value    RDW 11.5 (*)     All other components within normal limits   PROTIME-INR - Normal   COMPREHENSIVE METABOLIC PANEL    Narrative:     GFR Categories in Chronic Kidney Disease (CKD)              GFR Category          GFR (mL/min/1.73)    Interpretation  G1                    90 or greater        Normal or high (1)  G2                    60-89                Mild decrease  (1)  G3a                   45-59                Mild to moderate decrease  G3b                   30-44                Moderate to severe decrease  G4                    15-29                Severe decrease  G5                    14 or less           Kidney failure    (1)In the absence of evidence of kidney disease, neither GFR category G1 or G2 fulfill the criteria for CKD.    eGFR calculation 2021 CKD-EPI creatinine equation, which does not include race as a factor   CBC AND DIFFERENTIAL    Narrative:     The following orders were created for panel order CBC & Differential.  Procedure                               Abnormality         Status                     ---------                               -----------         ------                     CBC Auto Differential[[id number]]        Abnormal            Final result                 Please view results for these tests on the individual orders.       EKG:   No orders to display       Meds given in ED:   Medications   sodium chloride 0.9 % flush 10 mL (has no administration in time range)   diphenhydrAMINE (BENADRYL) injection 25 mg (0 mg Intravenous Hold 5/6/25 2127)   prochlorperazine (COMPAZINE) injection 10 mg (10 mg Intravenous Given 5/6/25 2129)   sodium chloride 0.9 % bolus 1,000 mL (0 mL Intravenous Stopped 5/6/25 2241)   iopamidol (ISOVUE-370) 76 % injection 100 mL (95 mL Intravenous Given by Other 5/6/25 2153)       Imaging results:  CT Angiogram Head  Result Date: 5/6/2025   Normal negative unenhanced head CT.  There is 0% stenosis in both internal carotid arteries. Both vertebral arteries are patent throughout the neck.  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 No other aneurysm is seen.  This report was finalized on 5/6/2025 10:39 PM by Dr. Arturo Zuluaga M.D on Workstation: [id number]      CT Angiogram Carotids  Result Date: 5/6/2025   Normal  negative unenhanced head CT.  There is 0% stenosis in both internal carotid arteries. Both vertebral arteries are patent throughout the neck.  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 No other aneurysm is seen.  This report was finalized on 5/6/2025 10:39 PM by Dr. Arturo Zuluaga M.D on Workstation: [id number]        Ambulatory status:   - up assist x1    Social issues:   Social History     Socioeconomic History    Marital status:    Tobacco Use    Smoking status: Never    Smokeless tobacco: Never   Vaping Use    Vaping status: Never Used   Substance and Sexual Activity    Alcohol use: Never    Drug use: Never    Sexual activity: Yes     Partners: Male     Birth control/protection: Vasectomy       Peripheral Neurovascular  Peripheral Neurovascular (Adult)  Peripheral Neurovascular WDL: WDL    Neuro Cognitive  Neuro Cognitive (Adult)  Cognitive/Neuro/Behavioral WDL: orientation, speech, level of consciousness, .WDL except  Level of Consciousness: Alert  Orientation: oriented x 4  Speech: clear, spontaneous, logical  Headache Assessment  Headache Location: other (see comments), orbital, scalp (left side, tender to touch on top of scalp)  Associated Signs/Symptoms: nausea, photophobia  Pupils  Pupil PERRLA: yes    Learning  Learning Assessment  Learning Readiness and Ability: no barriers identified  Education Provided  Person Taught: patient  Teaching Method: verbal instruction  Teaching Focus: symptom/problem overview, diagnostic test  Education Outcome Evaluation: acceptance expressed, verbalizes understanding    Respiratory  Respiratory WDL  Respiratory WDL: WDL    Abdominal Pain       Pain Assessments  Pain (Adult)  (0-10) Pain Rating: Rest: 8    NIH Stroke Scale       Juan Waters RN  05/07/25 00:27 EDT

## 2025-05-07 NOTE — CONSULTS
Neurology Consult Note    Consult Date: 5/7/2025    Referring MD: No ref. provider found    Reason for Consult I have been asked to see the patient in neurological consultation to render advice and opinion regarding headache.    Ekaterina Ortez is a 45 y.o. female past medical history of endometriosis and hormonal migraines she takes Tylenol and nortriptyline and sometimes Imitrex presents to the ED last night with a headache that is been going on for about a month.  Patient states it was progressive onset it was mainly located in the left occipital area of her head.  She describes the headache as a sharp shooting pain in the back of her head when she puts pressure on the left side that was intermittent.  The headache has been constant and never relieved.  States the pain is worse at night when she is lying down and better when she gets up and moves around throughout the day.  She has tried Tylenol and Motrin with mild relief.  She also has tried Imitrex with no relief.  She received Benadryl and Compazine in the ER and resulted in resolution of headache.  There is also some concern for left greater occipital neuralgia per primary care team.  She came to the ED yesterday because she felt like symptoms were getting worse over the last 2 to 3 days.  She denied any vision change, speech disturbance, focal weakness/numbness, facial droop.  She was having difficulty concentrating on reading due to discomfort from the pain.  She denies recent illness or infection.  She denies ever had a headache like this in the past.  No history of clotting disorder or blood clot during pregnancy.  She thought symptoms could potentially be due to allergies due to a swishing sound she heard in her ear.  She has tried allergy medications without any relief.  She was seen last in February after hitting her head and states that headache from that had since resolved and this is different.  She denies any more recent head trauma or injury to  "the head.  Blood pressure on arrival 124/84 mmHg pulse 88 bpm.        Past Medical/Surgical Hx:  Past Medical History:   Diagnosis Date    Endometriosis     Migraine      Past Surgical History:   Procedure Laterality Date     SECTION      x 3    DIAGNOSTIC LAPAROSCOPY      x 2       Medications On Admission  No medications prior to admission.       Allergies:  Allergies   Allergen Reactions    Penicillins Anaphylaxis    Keflex [Cephalexin] Rash       Social Hx:  Social History     Socioeconomic History    Marital status:    Tobacco Use    Smoking status: Never    Smokeless tobacco: Never   Vaping Use    Vaping status: Never Used   Substance and Sexual Activity    Alcohol use: Never    Drug use: Never    Sexual activity: Yes     Partners: Male     Birth control/protection: Vasectomy       Family Hx:  Family History   Problem Relation Age of Onset    Uterine cancer Neg Hx     Colon cancer Neg Hx     Ovarian cancer Neg Hx     Breast cancer Neg Hx        Exam    /79 (BP Location: Right arm, Patient Position: Lying)   Pulse 117   Temp 98.1 °F (36.7 °C) (Oral)   Resp 16   Ht 167.6 cm (66\")   Wt 56.2 kg (124 lb)   LMP 2025 (Approximate)   SpO2 93%   BMI 20.01 kg/m²   gen: NAD, vitals reviewed  MS: oriented x3, recent/remote memory intact, normal attention/concentration, language intact, no neglect, normal fund of knowledge  CN: visual acuity grossly normal, visual fields full, PERRL, EOMI, facial sensation equal, no facial droop, hearing symmetric, palate elevates symmetrically, shoulder shrug equal, tongue midline  Motor: 5/5 throughout upper and lower extremities, normal tone  Sensation: intact to vibration and temperature throughout  Reflexes: 2+ throughout upper and lower extremities, downgoing plantars  Coordination: no dysmetria with finger to nose bilaterally  Gait: no ataxia, normal station    DATA:    Lab Results   Component Value Date    GLUCOSE 103 (H) 2025    CALCIUM 8.4 " "(L) 05/07/2025     05/07/2025    K 3.8 05/07/2025    CO2 22.0 05/07/2025     (H) 05/07/2025    BUN 9 05/07/2025    CREATININE 0.55 (L) 05/07/2025    BCR 16.4 05/07/2025    ANIONGAP 8.0 05/07/2025     Lab Results   Component Value Date    WBC 5.90 05/07/2025    HGB 10.6 (L) 05/07/2025    HCT 32.1 (L) 05/07/2025    MCV 95.0 05/07/2025     05/07/2025     No results found for: \"LDL\"  No results found for: \"HGBA1C\"  Lab Results   Component Value Date    INR 1.01 05/06/2025    PROTIME 13.2 05/06/2025       Lab review:   CMP and CBC reviewed    Imaging review:   CTA head and neck:  Head CT: There is no CT evidence of acute intracranial hemorrhage, mass,  or infarct. There is no hydrocephalus or extra-axial fluid collection.   Brain parenchymal density is within normal limits.     CTA neck: There is a normal aortic arch branching pattern without  proximal great vessel stenosis. Both vertebral arteries are patent  throughout the neck, and supply the basilar artery.  Both common carotid  arteries are normally patent. There is 0% stenosis in both internal  carotid arteries.     CTA head:  There is symmetric distal intracranial runoff in the  anterior, middle, and posterior cerebral artery territories.  There is  no evidence of high-grade intracranial flow-limiting stenosis or branch  vessel occlusion. There is a left ICA 7 mm paraclinoid aneurysm. No  other aneurysm is seen. The dural venous sinuses appear normal.     Extravascular structures: There is no intracranial mass or abnormal  enhancement.   The extracranial and cervical soft tissue structures show  no acute abnormality.  The visualized lung apices show no acute  abnormality.  There is no acute bony abnormality.     IMPRESSION:     Normal negative unenhanced head CT.      There is 0% stenosis in both internal carotid arteries. Both vertebral  arteries are patent throughout the neck.     Head CTA shows no evidence of intracranial flow-limiting " stenosis or  branch vessel occlusion, but there is a 7 mm left paraclinoid ICA  aneurysm. No other aneurysm is seen.    CT head without contrast:  FINDINGS: The patient has undergone endovascular embolization of a left  paraclinoid aneurysm. There is no evidence of infarction, hemorrhage,  hydrocephalus or of abnormal extra-axial fluid. Further evaluation could  be performed with MRI examination of the brain as indicated.    Diagnoses:  Headache, intractable  Left paraclinoid ICA aneurysm  History of migraines    Comment: Patient complaining of headache x 1 month that is progressively gotten worse.  She states pain from headache has been so severe over the last 2 to 3 days that she has had trouble concentrating which prompted her to come into the ED for further evaluation.  She states the headache is left occipital that was constant with intermittent sharp shooting pain of the back of the head concerning for greater occipital neuralgia.  Pain worse at night and better when up and walking around during the day.  She expresses only minimal relief with use of Tylenol and Motrin.  No relief with use of Imitrex.  Was given migraine cocktail and headache improved from 8 out of 10 to 2 out of 10.  She admits to associated pulsatile tinnitus and sound sensitivity with this but denies any focal deficits.  She has never had a headache like this before.  Migraines typically related to her menses and of different character than current headache.  Did have head trauma in February which she had associated headache with but that had resolved prior to this onset.  Denies recent illness or infection.  Normotensive and afebrile on arrival.    CT head without contrast demonstrates no acute hemorrhage or hypodensity.  CTA head and neck demonstrate 7 mm left paraclinoid ICA aneurysm.  Neurosurgery seen evaluated patient and have taken patient for aneurysm repair.  Repeat CT head without contrast postoperatively demonstrates no acute  abnormality.  Postoperatively plan is for patient to be transferred to ICU.    Patient states headache currently is only 2 out of 10 in severity.  Patient states the pulses tinnitus and sharp shooting pain of the back of the head has resolved.  She did receive a dose of Dilaudid open the second to headache.  Should limit use of narcotics moving forward as can cause rebound headaches.  Can give migraine cocktail as needed.  Will hold off on MRI now considering symptoms have mainly resolved and only mild headache remaining.  Will consider MRI tomorrow if any recurrence in symptoms.    PLAN:   Patient had successful left paraclinoid aneurysm repair today, placed on aspirin/Brilinta  Limit the use of narcotics for treatment of headaches as can cause rebound headaches.  Give migraine cocktail as needed:  Compazine 10 mg injection q 8 hours for 3 doses  Benadryl 25 mg capsule q 8 hours for 3 doses  Patient symptoms much improved with migraine cocktail and postoperatively. Will hold off on MRI and reconsider tomorrow if fluctuations in symptoms.  Patient would like to follow up with neurology outpatient for migraine and likely occipital neuralgia management.     Recommendations discussed with Dr. Hamilton who is in agreement with plan.

## 2025-05-07 NOTE — OP NOTE
Pre-operative Diagnosis  6 mm ophthalmic artery aneurysm     Post-operative Diagnosis  S/p successful embolization of a 6 mm ophthalmic artery aneurysm using a 7 mm x 3 mm SL web device.  Moderate to severe stenosis was noted in a short segment of the left petrous ICA     Name of Procedure:  1. Diagnostic cerebral angiogram  2. 3-D angiogram with volume rendering  3. WEB embolization of a ophthalmic artery aneurysm          Devices used:  1. 5F sheath  2. 5F VTK, 6F Fubuki, Amarilis 5F  3. 0.035 Glidewire  4.   Via 17 micro catheter, Synchro soft microwire   5.   8 mm x 3 mmSL web device  6.  Proglide Perclose closure device        Indication  The patient is a 45-year-old female who presented to the emergency room with a 2-day history of a severe headache.  She reports that the headache started suddenly 2 days ago.  The headache is located in the back of her head and neck as well as some pain behind her left eye.  She had a CTA head and neck which demonstrates a 6 mm left sided ophthalmic artery aneurysm.  There was no evidence of hemorrhage on the CT images.  We discussed the risks and benefits of a cerebral angiogram with embolization of the aneurysm including, but not limited to the risk of infection, hemorrhage, stroke.  She expressed understanding of the risks and benefits and elected to proceed with the intervention.        Description of the procedure: The patient was brought to the angio suite and placed in a supine position on the exam table.  Both groins were prepped and draped in a sterile fashion.  Anatomical landmarks were outlined or puncture site below the inguinal ligament. Using a micropuncture set, access into the right femoral artery was obtained. A 5 Gu[place]an introducer sheath was inserted into the right common femoral artery.  An 80 cm 6 Guinean fubuki catheter was advanced over a Glidewire into the thoracic aorta.  The sheath was then advanced over a 5 [place]an diagnostic catheter into the left  internal carotid artery.  A 5 Bangladeshi Janneth catheter was advanced into the petrous ICA.  Baseline AP and lateral angiogram images were obtained which demonstrated a 7 mm ophthalmic artery aneurysm with a narrow neck measuring 2 mm.  A 3D angiogram using a power injector was performed with the catheter in the distal cervical ICA.  The aneurysm was best seen on the 3D images.  The patient was then given 5000 units of heparin to maintain an ACT goal of 250-300 throughout the procedure.  A via 17 microcatheter was used to catheterize the dome of the aneurysm over a microwire.  A 7 mm x 3 mm web device was deployed within the dome of the aneurysm.  The follow-up angiogram images demonstrated successful embolization of the ophthalmic artery aneurysm without evidence of large vessel occlusion or extravasation of contrast.  Of note the baseline angiogram images demonstrated moderate to severe stenosis of a short portion of the petrous carotid artery.  The stenosis was estimated to be 60 to 70% over a 10 mm segment.  The stenosis was nonflow limiting however was occlusive around the 5 Bangl[place]hi Janneth catheter.  The procedure was carried out with the Janneth catheter proximal to the stenotic segment.  After reviewing all of the images The catheters were then pulled back.  A femoral artery angiogram was performed demonstrating the femoral artery access in good position over the femoral head without evidence of dissection or extravasation.  The groin was closed with a Pro-glide closure device.  15 minutes of pressure was held.  The patient was extubated and transferred to the recovery unit in stable condition.  Estimated blood loss was less than 50 mL.  No specimens were sent.  There were no obvious complications.       Interpretation of the films:  1.  Left common carotid artery: AP, lateral, and obliques. Cervical views.  Left common carotid artery bifurcates into the internal and external carotid arteries with normal branches  of the external carotid artery. Mild tortuosity is noted. There is minimal atherosclerotic changes and no significant stenosis of the external or internal carotid arteries distal to the bifurcation.   2.  Left internal carotid artery. Intracranial views: AP, lateral, and obliques. The internal carotid artery injection shows the course of the ICA at the skull base and there is mild tortuosity with a segment of moderate to severe stenosis within the petrous segment.  The stenosis is estimated to be 60 to 70% over a 10 mm segment.. The cavernous segment is unremarkable. The internal carotid artery terminates bifurcating into the middle cerebral artery and anterior cerebral artery.  There is a 7 mm saccular aneurysm with a narrow neck arising from the ophthalmic segment.  The aneurysm has a narrow neck measuring 2 to 3 mm.  The aneurysm is wide neck measuring 3.5 mm.  The aneurysm is best seen on the 3D images.  Subsequent angiogram images demonstrate successful embolization of the aneurysm after placement of the 7 mm x 3mm web device.  The final follow-up angiogram images demonstrate successful embolization of the ophthalmic artery aneurysm with all other vessels filling at baseline.    3.  Femoral artery: AP view.  The femoral artery angiogram image was performed at the end of the procedure.  The access site is seen above the femoral head without evidence of stenosis or contrast extravasation or vessel dissection.    Disposition: Plan to admit to the ICU for overnight observation

## 2025-05-07 NOTE — PROGRESS NOTES
ED OBSERVATION PROGRESS/DISCHARGE SUMMARY    Date of Admission: 5/6/2025   LOS: 0 days   PCP: Provider, No Known    Final Diagnosis paraclinoid ICA aneurysm, left      Subjective     Hospital Outcome: Pending    Ekaterina Ortez is a 45 y.o. female, with past medical history of migraine headaches, was admitted to the observation unit with a complaint of a headache.  Patient reports left occipital headache x 1 month.  Pain is sharp and stabbing it feels very different than past migraines.  When the pain is at its best it is a dull ache.  Pain is in the left occipital region.  No visual disturbance, speech difficulty, numbness, tingling, weakness in the face/upper/lower extremities.  In the ED patient had CT angiograms that showed a 7 mm left paraclinoid ICA aneurysm.  Neurosurgery evaluation pending.    8:49 AM.  RN tells me she has spoken with neurosurgery and they are planning for procedure.  Will hold fast now until details develop.  Anticipate admission.  Patient resting quietly in no acute distress with normal vitals at this time.  Blood pressure 125/79.    10:42 AM.  Patient has left for procedure.  To be transferred to the ICU postoperatively.      ROS:  General: no fevers, chills  Respiratory: no cough, dyspnea  Cardiovascular: no chest pain, palpitations  Abdomen: No abdominal pain, nausea, vomiting, or diarrhea  Neurologic: Left occipital headache    Objective   Physical Exam:  I have reviewed the vital signs.  Temp:  [96.1 °F (35.6 °C)-98.4 °F (36.9 °C)] 98.1 °F (36.7 °C)  Heart Rate:  [] 117  Resp:  [16-18] 16  BP: (105-133)/(54-84) 125/79  General Appearance:    Alert, cooperative, no distress  Head:    Normocephalic, atraumatic  Eyes:    Sclerae anicteric  Neck:   Supple, no mass  Lungs: Clear to auscultation bilaterally, respirations unlabored  Heart: Regular rate and rhythm, S1 and S2 normal, no murmur, rub or gallop  Abdomen:  Soft, nontender, bowel sounds active, nondistended  Extremities: No  clubbing, cyanosis, or edema to lower extremities  Pulses:  2+ and symmetric in distal lower extremities  Skin: No rashes   Neurologic: Oriented x3, Normal strength to extremities    Results Review:    I have reviewed the labs, radiology results and diagnostic studies.    Results from last 7 days   Lab Units 05/07/25  0423   WBC 10*3/mm3 5.90   HEMOGLOBIN g/dL 10.6*   HEMATOCRIT % 32.1*   PLATELETS 10*3/mm3 222     Results from last 7 days   Lab Units 05/07/25  0423 05/06/25  2120   SODIUM mmol/L 140 140   POTASSIUM mmol/L 3.8 3.6   CHLORIDE mmol/L 110* 106   CO2 mmol/L 22.0 25.0   BUN mg/dL 9 10   CREATININE mg/dL 0.55* 0.65   CALCIUM mg/dL 8.4* 9.0   BILIRUBIN mg/dL  --  0.3   ALK PHOS U/L  --  41   ALT (SGPT) U/L  --  15   AST (SGOT) U/L  --  16   GLUCOSE mg/dL 103* 97     Imaging Results (Last 24 Hours)       Procedure Component Value Units Date/Time    IR Embolization [[id number]] Resulted: 05/07/25 1020     Updated: 05/07/25 1020    CT Angiogram Head [[id number]] Collected: 05/06/25 2228     Updated: 05/06/25 2242    Narrative:      HEAD CT WITHOUT CONTRAST, HEAD & NECK CTA WITH CONTRAST     INDICATION:  Worsening headache.     TECHNIQUE:  Axial images were acquired from the skull base to vertex without  contrast, including multiplanar reformats, per standard departmental  protocol , followed by CT angiogram of the head and neck with IV  contrast. 3-D postprocessing was performed and reviewed.  Evaluation for  a significant carotid arterial stenosis is based on the NASCET criteria.   Radiation dose reduction techniques were utilized, including automated  exposure control, and exposure modulation based on body size.     COMPARISON:  None available.     FINDINGS:     Head CT: There is no CT evidence of acute intracranial hemorrhage, mass,  or infarct. There is no hydrocephalus or extra-axial fluid collection.   Brain parenchymal density is within normal limits.     CTA neck: There is a normal aortic arch branching  pattern without  proximal great vessel stenosis. Both vertebral arteries are patent  throughout the neck, and supply the basilar artery.  Both common carotid  arteries are normally patent. There is 0% stenosis in both internal  carotid arteries.     CTA head:  There is symmetric distal intracranial runoff in the  anterior, middle, and posterior cerebral artery territories.  There is  no evidence of high-grade intracranial flow-limiting stenosis or branch  vessel occlusion. There is a left ICA 7 mm paraclinoid aneurysm. No  other aneurysm is seen. The dural venous sinuses appear normal.     Extravascular structures: There is no intracranial mass or abnormal  enhancement.   The extracranial and cervical soft tissue structures show  no acute abnormality.  The visualized lung apices show no acute  abnormality.  There is no acute bony abnormality.       Impression:         Normal negative unenhanced head CT.      There is 0% stenosis in both internal carotid arteries. Both vertebral  arteries are patent throughout the neck.     Head CTA shows no evidence of intracranial flow-limiting stenosis or  branch vessel occlusion, but there is a 7 mm left paraclinoid ICA  aneurysm. No other aneurysm is seen.     This report was finalized on 5/6/2025 10:39 PM by Dr. Arturo Zuluaga M.D on Workstation: [id number]       CT Angiogram Carotids [[id number]] Collected: 05/06/25 2228     Updated: 05/06/25 2242    Narrative:      HEAD CT WITHOUT CONTRAST, HEAD & NECK CTA WITH CONTRAST     INDICATION:  Worsening headache.     TECHNIQUE:  Axial images were acquired from the skull base to vertex without  contrast, including multiplanar reformats, per standard departmental  protocol , followed by CT angiogram of the head and neck with IV  contrast. 3-D postprocessing was performed and reviewed.  Evaluation for  a significant carotid arterial stenosis is based on the NASCET criteria.   Radiation dose reduction techniques were utilized,  including automated  exposure control, and exposure modulation based on body size.     COMPARISON:  None available.     FINDINGS:     Head CT: There is no CT evidence of acute intracranial hemorrhage, mass,  or infarct. There is no hydrocephalus or extra-axial fluid collection.   Brain parenchymal density is within normal limits.     CTA neck: There is a normal aortic arch branching pattern without  proximal great vessel stenosis. Both vertebral arteries are patent  throughout the neck, and supply the basilar artery.  Both common carotid  arteries are normally patent. There is 0% stenosis in both internal  carotid arteries.     CTA head:  There is symmetric distal intracranial runoff in the  anterior, middle, and posterior cerebral artery territories.  There is  no evidence of high-grade intracranial flow-limiting stenosis or branch  vessel occlusion. There is a left ICA 7 mm paraclinoid aneurysm. No  other aneurysm is seen. The dural venous sinuses appear normal.     Extravascular structures: There is no intracranial mass or abnormal  enhancement.   The extracranial and cervical soft tissue structures show  no acute abnormality.  The visualized lung apices show no acute  abnormality.  There is no acute bony abnormality.       Impression:         Normal negative unenhanced head CT.      There is 0% stenosis in both internal carotid arteries. Both vertebral  arteries are patent throughout the neck.     Head CTA shows no evidence of intracranial flow-limiting stenosis or  branch vessel occlusion, but there is a 7 mm left paraclinoid ICA  aneurysm. No other aneurysm is seen.     This report was finalized on 5/6/2025 10:39 PM by Dr. Arturo Zuluaga M.D on Workstation: [id number]               I have reviewed the medications.     Discharge Medications      Patient Not Prescribed Medications Upon Discharge        ---------------------------------------------------------------------------------------------  Assessment  & Plan   Assessment/Problem List    Headache      Plan:    Headache  -Neurochecks every 4 hours   -Vital signs every 4 hours   -Cardiac monitoring   -CT Head-negative  -CTA Head/neck -0% stenosis of both internal carotid arteries both vertebral arteries are patent throughout the neck.  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  No other aneurysm seen.  -PT to eval and treat  - Plan as above     VTE Prophylaxis:  Mechanical VTE prophylaxis orders are present.    Disposition: Transfer to neurosurgery, ICU    Follow-up after Discharge: Pending    This note will serve as a progress note    Benito Mcdermott III, PA 05/07/25 10:44 EDT    I have worn appropriate PPE during this patient encounter, sanitized my hands both with entering and exiting patient's room.      50 minutes has been spent by [facility] providers in the care of this patient while under observation status on this date 05/07/25

## 2025-05-08 ENCOUNTER — APPOINTMENT (OUTPATIENT)
Dept: CT IMAGING | Facility: HOSPITAL | Age: 45
End: 2025-05-08
Payer: COMMERCIAL

## 2025-05-08 ENCOUNTER — TELEPHONE (OUTPATIENT)
Dept: NEUROSURGERY | Facility: CLINIC | Age: 45
End: 2025-05-08
Payer: COMMERCIAL

## 2025-05-08 ENCOUNTER — APPOINTMENT (OUTPATIENT)
Dept: MRI IMAGING | Facility: HOSPITAL | Age: 45
End: 2025-05-08
Payer: COMMERCIAL

## 2025-05-08 DIAGNOSIS — I67.1 ANEURYSM OF OPHTHALMIC ARTERY: Primary | ICD-10-CM

## 2025-05-08 PROBLEM — R00.0 TACHYCARDIA: Status: ACTIVE | Noted: 2025-05-08

## 2025-05-08 PROBLEM — D64.9 ANEMIA: Status: ACTIVE | Noted: 2025-05-08

## 2025-05-08 PROBLEM — I65.22 INTERNAL CAROTID ARTERY STENOSIS, LEFT: Status: ACTIVE | Noted: 2025-05-08

## 2025-05-08 PROBLEM — D62 ANEMIA, POSTHEMORRHAGIC, ACUTE: Status: ACTIVE | Noted: 2025-05-08

## 2025-05-08 LAB
ALBUMIN SERPL-MCNC: 3.5 G/DL (ref 3.5–5.2)
ANION GAP SERPL CALCULATED.3IONS-SCNC: 11 MMOL/L (ref 5–15)
BUN SERPL-MCNC: 5 MG/DL (ref 6–20)
BUN/CREAT SERPL: 8.9 (ref 7–25)
CALCIUM SPEC-SCNC: 7.9 MG/DL (ref 8.6–10.5)
CHLORIDE SERPL-SCNC: 108 MMOL/L (ref 98–107)
CHOLEST SERPL-MCNC: 182 MG/DL (ref 0–200)
CO2 SERPL-SCNC: 20 MMOL/L (ref 22–29)
CREAT SERPL-MCNC: 0.56 MG/DL (ref 0.57–1)
DEPRECATED RDW RBC AUTO: 40 FL (ref 37–54)
EGFRCR SERPLBLD CKD-EPI 2021: 115.6 ML/MIN/1.73
ERYTHROCYTE [DISTWIDTH] IN BLOOD BY AUTOMATED COUNT: 11.6 % (ref 12.3–15.4)
GLUCOSE BLDC GLUCOMTR-MCNC: 104 MG/DL (ref 70–130)
GLUCOSE BLDC GLUCOMTR-MCNC: 112 MG/DL (ref 70–130)
GLUCOSE SERPL-MCNC: 97 MG/DL (ref 65–99)
HBA1C MFR BLD: 4.7 % (ref 4.8–5.6)
HCT VFR BLD AUTO: 29.4 % (ref 34–46.6)
HDLC SERPL-MCNC: 54 MG/DL (ref 40–60)
HGB BLD-MCNC: 9.5 G/DL (ref 12–15.9)
IRON 24H UR-MRATE: 92 MCG/DL (ref 37–145)
IRON SATN MFR SERPL: 29 % (ref 20–50)
LDLC SERPL CALC-MCNC: 109 MG/DL (ref 0–100)
LDLC/HDLC SERPL: 1.97 {RATIO}
MCH RBC QN AUTO: 30.9 PG (ref 26.6–33)
MCHC RBC AUTO-ENTMCNC: 32.3 G/DL (ref 31.5–35.7)
MCV RBC AUTO: 95.8 FL (ref 79–97)
PHOSPHATE SERPL-MCNC: 3.3 MG/DL (ref 2.5–4.5)
PLATELET # BLD AUTO: 211 10*3/MM3 (ref 140–450)
PMV BLD AUTO: 10.7 FL (ref 6–12)
POTASSIUM SERPL-SCNC: 3.5 MMOL/L (ref 3.5–5.2)
POTASSIUM SERPL-SCNC: 4.2 MMOL/L (ref 3.5–5.2)
RBC # BLD AUTO: 3.07 10*6/MM3 (ref 3.77–5.28)
SODIUM SERPL-SCNC: 139 MMOL/L (ref 136–145)
TIBC SERPL-MCNC: 313 MCG/DL (ref 298–536)
TRANSFERRIN SERPL-MCNC: 210 MG/DL (ref 200–360)
TRIGL SERPL-MCNC: 108 MG/DL (ref 0–150)
TSH SERPL DL<=0.05 MIU/L-ACNC: 1.92 UIU/ML (ref 0.27–4.2)
VLDLC SERPL-MCNC: 19 MG/DL (ref 5–40)
WBC NRBC COR # BLD AUTO: 9.71 10*3/MM3 (ref 3.4–10.8)

## 2025-05-08 PROCEDURE — G0378 HOSPITAL OBSERVATION PER HR: HCPCS

## 2025-05-08 PROCEDURE — 96376 TX/PRO/DX INJ SAME DRUG ADON: CPT

## 2025-05-08 PROCEDURE — 84443 ASSAY THYROID STIM HORMONE: CPT | Performed by: INTERNAL MEDICINE

## 2025-05-08 PROCEDURE — 25810000003 SODIUM CHLORIDE 0.9 % SOLUTION: Performed by: INTERNAL MEDICINE

## 2025-05-08 PROCEDURE — 83036 HEMOGLOBIN GLYCOSYLATED A1C: CPT | Performed by: STUDENT IN AN ORGANIZED HEALTH CARE EDUCATION/TRAINING PROGRAM

## 2025-05-08 PROCEDURE — 85027 COMPLETE CBC AUTOMATED: CPT | Performed by: INTERNAL MEDICINE

## 2025-05-08 PROCEDURE — 83540 ASSAY OF IRON: CPT | Performed by: INTERNAL MEDICINE

## 2025-05-08 PROCEDURE — 96375 TX/PRO/DX INJ NEW DRUG ADDON: CPT

## 2025-05-08 PROCEDURE — 25510000002 GADOBENATE DIMEGLUMINE 529 MG/ML SOLUTION: Performed by: HOSPITALIST

## 2025-05-08 PROCEDURE — 25010000002 MORPHINE PER 10 MG: Performed by: NEUROLOGICAL SURGERY

## 2025-05-08 PROCEDURE — 82948 REAGENT STRIP/BLOOD GLUCOSE: CPT

## 2025-05-08 PROCEDURE — 70553 MRI BRAIN STEM W/O & W/DYE: CPT

## 2025-05-08 PROCEDURE — 84466 ASSAY OF TRANSFERRIN: CPT | Performed by: INTERNAL MEDICINE

## 2025-05-08 PROCEDURE — A9577 INJ MULTIHANCE: HCPCS | Performed by: HOSPITALIST

## 2025-05-08 PROCEDURE — 25010000002 ONDANSETRON PER 1 MG: Performed by: NEUROLOGICAL SURGERY

## 2025-05-08 PROCEDURE — 80069 RENAL FUNCTION PANEL: CPT | Performed by: INTERNAL MEDICINE

## 2025-05-08 PROCEDURE — 84132 ASSAY OF SERUM POTASSIUM: CPT

## 2025-05-08 PROCEDURE — 25010000002 PROCHLORPERAZINE 10 MG/2ML SOLUTION

## 2025-05-08 PROCEDURE — 99232 SBSQ HOSP IP/OBS MODERATE 35: CPT | Performed by: NURSE PRACTITIONER

## 2025-05-08 PROCEDURE — 96361 HYDRATE IV INFUSION ADD-ON: CPT

## 2025-05-08 PROCEDURE — 25010000002 MIDAZOLAM PER 1 MG: Performed by: STUDENT IN AN ORGANIZED HEALTH CARE EDUCATION/TRAINING PROGRAM

## 2025-05-08 PROCEDURE — 80061 LIPID PANEL: CPT | Performed by: STUDENT IN AN ORGANIZED HEALTH CARE EDUCATION/TRAINING PROGRAM

## 2025-05-08 PROCEDURE — 70450 CT HEAD/BRAIN W/O DYE: CPT

## 2025-05-08 RX ORDER — SODIUM CHLORIDE 0.9 % (FLUSH) 0.9 %
10 SYRINGE (ML) INJECTION AS NEEDED
Status: DISCONTINUED | OUTPATIENT
Start: 2025-05-08 | End: 2025-05-10 | Stop reason: HOSPADM

## 2025-05-08 RX ORDER — POTASSIUM CHLORIDE 1500 MG/1
40 TABLET, EXTENDED RELEASE ORAL EVERY 4 HOURS
Status: COMPLETED | OUTPATIENT
Start: 2025-05-08 | End: 2025-05-08

## 2025-05-08 RX ORDER — ATORVASTATIN CALCIUM 20 MG/1
40 TABLET, FILM COATED ORAL NIGHTLY
Status: DISCONTINUED | OUTPATIENT
Start: 2025-05-08 | End: 2025-05-10 | Stop reason: HOSPADM

## 2025-05-08 RX ORDER — MIDAZOLAM HYDROCHLORIDE 1 MG/ML
2 INJECTION, SOLUTION INTRAMUSCULAR; INTRAVENOUS ONCE
Status: COMPLETED | OUTPATIENT
Start: 2025-05-08 | End: 2025-05-08

## 2025-05-08 RX ORDER — SODIUM CHLORIDE 9 MG/ML
40 INJECTION, SOLUTION INTRAVENOUS AS NEEDED
Status: DISCONTINUED | OUTPATIENT
Start: 2025-05-08 | End: 2025-05-10 | Stop reason: HOSPADM

## 2025-05-08 RX ORDER — SODIUM CHLORIDE 0.9 % (FLUSH) 0.9 %
10 SYRINGE (ML) INJECTION EVERY 12 HOURS SCHEDULED
Status: DISCONTINUED | OUTPATIENT
Start: 2025-05-08 | End: 2025-05-10 | Stop reason: HOSPADM

## 2025-05-08 RX ADMIN — POTASSIUM CHLORIDE 40 MEQ: 1500 TABLET, EXTENDED RELEASE ORAL at 06:03

## 2025-05-08 RX ADMIN — HYDROCODONE BITARTRATE AND ACETAMINOPHEN 2 TABLET: 5; 325 TABLET ORAL at 23:55

## 2025-05-08 RX ADMIN — ASPIRIN 81 MG: 81 TABLET, COATED ORAL at 08:00

## 2025-05-08 RX ADMIN — HYDROCODONE BITARTRATE AND ACETAMINOPHEN 2 TABLET: 5; 325 TABLET ORAL at 02:25

## 2025-05-08 RX ADMIN — MUPIROCIN 1 APPLICATION: 20 OINTMENT TOPICAL at 06:05

## 2025-05-08 RX ADMIN — SODIUM CHLORIDE 100 ML/HR: 9 INJECTION, SOLUTION INTRAVENOUS at 02:25

## 2025-05-08 RX ADMIN — GADOBENATE DIMEGLUMINE 15 ML: 529 INJECTION, SOLUTION INTRAVENOUS at 14:50

## 2025-05-08 RX ADMIN — PROCHLORPERAZINE EDISYLATE 10 MG: 5 INJECTION, SOLUTION INTRAMUSCULAR; INTRAVENOUS at 07:57

## 2025-05-08 RX ADMIN — MIDAZOLAM 2 MG: 1 INJECTION INTRAMUSCULAR; INTRAVENOUS at 13:58

## 2025-05-08 RX ADMIN — POTASSIUM CHLORIDE 40 MEQ: 1500 TABLET, EXTENDED RELEASE ORAL at 10:10

## 2025-05-08 RX ADMIN — MORPHINE SULFATE 2 MG: 2 INJECTION, SOLUTION INTRAMUSCULAR; INTRAVENOUS at 20:45

## 2025-05-08 RX ADMIN — ONDANSETRON 4 MG: 2 INJECTION, SOLUTION INTRAMUSCULAR; INTRAVENOUS at 20:37

## 2025-05-08 RX ADMIN — PROCHLORPERAZINE EDISYLATE 10 MG: 5 INJECTION, SOLUTION INTRAMUSCULAR; INTRAVENOUS at 23:55

## 2025-05-08 RX ADMIN — Medication 10 ML: at 21:56

## 2025-05-08 RX ADMIN — HYDROCODONE BITARTRATE AND ACETAMINOPHEN 2 TABLET: 5; 325 TABLET ORAL at 17:56

## 2025-05-08 RX ADMIN — HYDROCODONE BITARTRATE AND ACETAMINOPHEN 2 TABLET: 5; 325 TABLET ORAL at 12:42

## 2025-05-08 RX ADMIN — Medication 10 ML: at 08:00

## 2025-05-08 NOTE — PLAN OF CARE
Goal Outcome Evaluation:      Called 5 park to give report at 11:30. Waiting for call back.

## 2025-05-08 NOTE — PROGRESS NOTES
Appreciate [facility] taking over care.  Intensivist team will sign off.    Electronically signed by Manoj Lira MD, 05/08/25, 4:11 PM EDT.

## 2025-05-08 NOTE — PLAN OF CARE
Goal Outcome Evaluation:   Called to give report at 11:50 spoke to charge RN. Brayan has been notified to call back for report.

## 2025-05-08 NOTE — PLAN OF CARE
Problem: Adult Inpatient Plan of Care  Goal: Plan of Care Review  Outcome: Progressing  Flowsheets (Taken 5/8/2025 1824)  Progress: improving  Plan of Care Reviewed With:   patient   spouse  Goal: Patient-Specific Goal (Individualized)  Outcome: Progressing  Goal: Absence of Hospital-Acquired Illness or Injury  Outcome: Progressing  Intervention: Identify and Manage Fall Risk  Recent Flowsheet Documentation  Taken 5/8/2025 1800 by Brayan Powell RN  Safety Promotion/Fall Prevention:   safety round/check completed   assistive device/personal items within reach  Taken 5/8/2025 1600 by Brayan Powell RN  Safety Promotion/Fall Prevention:   safety round/check completed   assistive device/personal items within reach  Taken 5/8/2025 1400 by Brayan Powell RN  Safety Promotion/Fall Prevention:   safety round/check completed   assistive device/personal items within reach  Taken 5/8/2025 1240 by Brayan Powell RN  Safety Promotion/Fall Prevention:   safety round/check completed   assistive device/personal items within reach  Intervention: Prevent and Manage VTE (Venous Thromboembolism) Risk  Recent Flowsheet Documentation  Taken 5/8/2025 1235 by Brayan Powell RN  VTE Prevention/Management: (ASA, brilinta) other (see comments)  Intervention: Prevent Infection  Recent Flowsheet Documentation  Taken 5/8/2025 1800 by Brayan Powell RN  Infection Prevention:   personal protective equipment utilized   environmental surveillance performed  Taken 5/8/2025 1600 by Brayan Powell RN  Infection Prevention:   personal protective equipment utilized   environmental surveillance performed  Taken 5/8/2025 1400 by Brayan Powell RN  Infection Prevention:   personal protective equipment utilized   environmental surveillance performed  Taken 5/8/2025 1240 by Brayan Powell RN  Infection Prevention:   personal protective equipment utilized   environmental surveillance  performed  Goal: Optimal Comfort and Wellbeing  Outcome: Progressing  Intervention: Provide Person-Centered Care  Recent Flowsheet Documentation  Taken 5/8/2025 1235 by Brayan Powell, RN  Trust Relationship/Rapport:   care explained   choices provided   emotional support provided   empathic listening provided   questions answered   questions encouraged   reassurance provided   thoughts/feelings acknowledged  Goal: Readiness for Transition of Care  Outcome: Progressing   Goal Outcome Evaluation:  Plan of Care Reviewed With: patient, spouse        Progress: improving

## 2025-05-08 NOTE — PROGRESS NOTES
[facility] NEUROSURGERY INTRACRANIAL PROGRESS NOTE    PATIENT IDENTIFICATION:   Name:  Ekaterina Ortez      MRN:  [id number]     45 y.o.  female               CC: 6 mm left ophthalmic artery aneurysm postprocedure day #1 successful embolization with web device.      Subjective     Interval History: No new complaints or events overnight.  Ready to go home.  She does still have an intermittent headache but has improved.    ROS:  Constitutional: No fever, chills  HEENT: + headache, no vision changes, no tinnitus, no hearing difficulties  Neck: no stiffness  GI: No nausea, vomiting, no swallow difficulties  Neuro: No numbness, tingling, or weakness, no speech difficulties, no balance difficulties    Objective     Vital signs in last 24 hours:  Temp:  [98.3 °F (36.8 °C)-99.4 °F (37.4 °C)] 98.4 °F (36.9 °C)  Heart Rate:  [] 80  Resp:  [16] 16  BP: ()/(59-83) 110/71    Intake/Output this shift:  No intake/output data recorded.    Intake/Output last 3 shifts:  I/O last 3 completed shifts:  In: 2000 [I.V.:1000; IV Piggyback:1000]  Out: 1300 [Urine:1300]    LABS:  Results from last 7 days   Lab Units 05/08/25  0306 05/07/25  0423 05/06/25  2120   WBC 10*3/mm3 9.71 5.90 5.59   HEMOGLOBIN g/dL 9.5* 10.6* 12.0   HEMATOCRIT % 29.4* 32.1* 35.7   PLATELETS 10*3/mm3 211 222 280       Results from last 7 days   Lab Units 05/08/25  0306 05/07/25  0423 05/06/25  2120   SODIUM mmol/L 139 140 140   POTASSIUM mmol/L 3.5 3.8 3.6   CHLORIDE mmol/L 108* 110* 106   CO2 mmol/L 20.0* 22.0 25.0   BUN mg/dL 5* 9 10   CREATININE mg/dL 0.56* 0.55* 0.65   GLUCOSE mg/dL 97 103* 97   CALCIUM mg/dL 7.9* 8.4* 9.0          IMAGING STUDIES:      CT head:  Stable.  No acute hemorrhage, hydrocephalus or mass effect.      I personally viewed and interpreted the patient's chart.    Meds reviewed/changed: Yes      Physical exam  Awake, alert, oriented x3  Pupils equal round reactive to light  Extraocular muscles intact  Face symmetric  Speech is  "fluent and clear  No pronator drift  Motor exam  Bilateral deltoids 5/5, bilateral biceps 5/5, bilateral triceps 5/5, bilateral wrist extension 5/5 bilateral hand  5/5  Bilateral hip flexion 5/5, bilateral knee extension 5/5, bilateral DF/PF 5/5  gait independent   Able to detect  light touch in all 4 extremities  Right groin site flat, soft, no hematoma, pedal pulses present    Assessment & Plan     ASSESSMENT:      Headache    6 mm left ophthalmic artery aneurysm postprocedure day #1 successful embolization with web device.    PLAN:     CT stable.  From a neurosurgical standpoint she is okay to discharge home.  We have reached out to neurology in regards to the left ICA stenosis found and need for a statin.  They will address.  She will need to follow-up with us in 1 month with a CTA head and neck and has been instructed to return sooner or come back to the ER should she develop any strokelike symptoms.    I discussed the patient's findings and my recommendations with patient and nursing staff    I spent 38 minutes caring for Ekaterina Ortez on this date of service. This time includes time spent by me in the following activities: preparing for the visit, reviewing tests, obtaining and/or reviewing a separately obtained history, performing a medically appropriate examination and/or evaluation, counseling and educating the patient/family/caregiver, ordering medications, tests, or procedures, referring and communicating with other health care professionals, documenting information in the medical record, independently interpreting results and communicating that information with the patient/family/caregiver, and care coordination          LOS: 0 days       Kriss Chapa, APRN  5/8/2025  08:42 EDT    \"Dictated utilizing Dragon dictation\".      "

## 2025-05-08 NOTE — CONSULTS
Inpatient Hospitalist Consult  Consult performed by: Perry Orta MD  Consult ordered by: Manoj Lira MD  Reason for consult: Anemia and tachycardia      Date of Admit: 2025  Date of Consult: 25    Subjective   45 y.o. female presented with severe worsening of her usual migraine headache for a couple of days. Cerebral angiogram showed left-sided ophthalmic artery aneurysm and on 2029 she underwent embolization by neurosurgery. She also has left ICA stenosis neurology has been consulted. She is on aspirin and statin and an MRI is pending. She has been transferred out of the ICU and [facility] consulted to evaluate tachycardia and anemia.    Past Medical History:   Diagnosis Date    Endometriosis     Migraine      Past Surgical History:   Procedure Laterality Date     SECTION      x 3    DIAGNOSTIC LAPAROSCOPY      x 2       Family History   Problem Relation Age of Onset    Uterine cancer Neg Hx     Colon cancer Neg Hx     Ovarian cancer Neg Hx     Breast cancer Neg Hx      Social History     Tobacco Use    Smoking status: Never    Smokeless tobacco: Never   Vaping Use    Vaping status: Never Used   Substance Use Topics    Alcohol use: Never    Drug use: Never     Objective      Vital Signs  Temp:  [98.2 °F (36.8 °C)-99.4 °F (37.4 °C)] 98.2 °F (36.8 °C)  Heart Rate:  [] 93  Resp:  [16] 16  BP: ()/(59-83) 122/70    Physical Exam  Constitutional:       General: She is not in acute distress.     Appearance: She is not toxic-appearing.   HENT:      Head: Normocephalic and atraumatic.   Cardiovascular:      Rate and Rhythm: Normal rate and regular rhythm.   Pulmonary:      Effort: Pulmonary effort is normal. No respiratory distress.      Breath sounds: Normal breath sounds. No wheezing or rhonchi.   Abdominal:      General: Bowel sounds are normal.      Palpations: Abdomen is soft.      Tenderness: There is no abdominal tenderness. There is no guarding or rebound.   Musculoskeletal:          General: No swelling.   Skin:     General: Skin is warm and dry.   Neurological:      General: No focal deficit present.      Mental Status: She is alert and oriented to person, place, and time.   Psychiatric:         Mood and Affect: Mood normal.         Behavior: Behavior normal.         Assessment & Plan   Active Hospital Problems    Diagnosis  POA    **Headache [R51.9]  Yes    Aneurysm of ophthalmic artery [I67.1]  Unknown    Internal carotid artery stenosis, left [I65.22]  Unknown    Tachycardia [R00.0]  Unknown    Anemia [D64.9]  Unknown      Resolved Hospital Problems   No resolved problems to display.     Patient is a 45 y.o. female    Headache  Ophthalmic artery aneurysm  Status post embolization 5/7/2025   CT scan stable. Follow-up neurosurgery 1 month with CTA head and neck    Left ICA stenosis  Aspirin and statin  Neurology following  Outpatient ophthalmology appointment to look for papilledema  MRI pending    Tachycardia improved monitor for now    Anemia   Iron studies normal  hemoglobin dropped since admission-she has been on fluids may have some dilution no obvious bleeding will continue to monitor    A1c 4.70%  TSH okay    [facility] happy to assume care out of unit    Discussed with patient and family and Arlene Chapa neurosurgery APRN.    Perry Orta MD  [facility]  05/08/25 13:13 EDT

## 2025-05-08 NOTE — PROGRESS NOTES
[facility]  [phone]  Dr. Manoj Lira    Subjective:  LOS: 0    Chief Complaint:  Aneurysm embolization    Awake and alert with same headache.  No new focal weakness.    Objective   Vital Signs past 24hrs  Temp range: Temp (24hrs), Av.6 °F (37 °C), Min:98.3 °F (36.8 °C), Max:99.4 °F (37.4 °C)    BP range: BP: ()/(59-83) 107/67  Pulse range: Heart Rate:  [] 91  Resp rate range: Resp:  [16] 16  Device (Oxygen Therapy): room air   Oxygen range:SpO2:  [96 %-100 %] 98 %   Mechanical Ventilator:     Physical Exam  Eyes:      Pupils: Pupils are equal, round, and reactive to light.   Cardiovascular:      Rate and Rhythm: Normal rate and regular rhythm.      Heart sounds: No murmur heard.  Abdominal:      General: Bowel sounds are normal.      Palpations: Abdomen is soft. There is no mass.      Tenderness: There is no abdominal tenderness.   Musculoskeletal:         General: No swelling.   Neurological:      Mental Status: She is alert.       Results Review:    I have reviewed the laboratory and imaging data since the last note by [facility] physician.  My annotations are noted in assessment and plan.      Result Review:  I have personally reviewed the results from last note by [facility] physician to 2025 10:40 EDT and agree with these findings:  [x]  Laboratory list / accordion  [x]  Microbiology  [x]  Radiology  []  EKG/Telemetry   []  Cardiology/Vascular   []  Pathology  []  Old records  []  Other:      Medication Review:  I have reviewed the current MAR.  My annotations are noted in assessment and plan.    aspirin, 81 mg, Oral, Daily  atorvastatin, 40 mg, Oral, Nightly  diphenhydrAMINE, 25 mg, Intravenous, Once  mupirocin, 1 Application, Each Nare, BID  sodium chloride, 10 mL, Intravenous, Q12H        lactated ringers, 9 mL/hr  niCARdipine, 5-15 mg/hr  sodium chloride, 100 mL/hr, Last Rate: 100 mL/hr (25 0225)      Lines, Drains & Airways       Active LDAs       Name Placement date  Placement time Site Days    Peripheral IV 05/06/25 2120 20 G Left Antecubital 05/06/25 2120  Antecubital  1                    PCCM Problems  Headache with left ophthalmic artery aneurysm, now status post web embolization on 5/7/2025  History endometriosis  Tachycardia  Left ICA stenosis  Anemia    Plan of Treatment    Persistent headache.  Discussed with neurology.  She has moderate to severe left ICA stenosis.  Will need MRI.  She will need additional labs and may require to start a statin.  Neurology has recommended an LP to rule out pseudotumor.  However patient has declined.  Note that neurosurgery is okay with patient leaving.  They would like to see her back in 1 month with CTA head and neck.  Neurology also recommends outpatient ophthalmology appointment to look for papilledema and possible pseudotumor given refusal of LP.    Tachycardia at bedside yesterday.  Now improved and I will therefore discontinue fluids    Transfer to the floors for further workup.  Will consult [facility] to help with management of anemia and tachycardia.    Manoj Lira MD  05/08/25  10:40 EDT    Isolation status: No active isolations    Dietary Orders (From admission, onward)       Start     Ordered    05/07/25 1457  Diet: Regular/House; Fluid Consistency: Thin (IDDSI 0)  Diet Effective Now        References:    Diet Order Definitions   Question Answer Comment   Diets: Regular/House    Fluid Consistency: Thin (IDDSI 0)        05/07/25 145                    Part of this note may be an electronic transcription/translation of spoken language to printed text using the Dragon Dictation System.

## 2025-05-08 NOTE — TELEPHONE ENCOUNTER
----- Message from Kriss Chapa sent at 5/8/2025  8:41 AM EDT -----  Regarding: follow up  please have patient follow up in 1 month with CTA h/n- follow up with NT

## 2025-05-08 NOTE — CASE MANAGEMENT/SOCIAL WORK
Discharge Planning Assessment  [facility]     Patient Name: Ekaterina Ortez  MRN: [id number]  Today's Date: 5/8/2025    Admit Date: 5/6/2025    Plan: Plans discharge home with assist of family.   Discharge Needs Assessment       Row Name 05/08/25 1800       Living Environment    People in Home child(ashutosh), dependent;spouse    Name(s) of People in Home Harjeet Ortez/spouse [phone]  and 2 sons ages 9 & 17    Current Living Arrangements home    Potentially Unsafe Housing Conditions none    In the past 12 months has the electric, gas, oil, or water company threatened to shut off services in your home? No    Primary Care Provided by self    Family Caregiver if Needed spouse    Family Caregiver Names Harjeet Ortez/spouse [phone]    Quality of Family Relationships helpful;involved;supportive    Able to Return to Prior Arrangements yes       Resource/Environmental Concerns    Resource/Environmental Concerns home accessibility    Home Accessibility Concerns stairs to enter home    Transportation Concerns none       Transportation Needs    In the past 12 months, has lack of transportation kept you from medical appointments or from getting medications? no    In the past 12 months, has lack of transportation kept you from meetings, work, or from getting things needed for daily living? No       Food Insecurity    Within the past 12 months, you worried that your food would run out before you got the money to buy more. Never true    Within the past 12 months, the food you bought just didn't last and you didn't have money to get more. Never true       Transition Planning    Patient/Family Anticipates Transition to home with family    Patient/Family Anticipated Services at Transition none    Transportation Anticipated family or friend will provide       Discharge Needs Assessment    Readmission Within the Last 30 Days no previous admission in last 30 days    Equipment Currently Used at Home none    Concerns to be  Addressed no discharge needs identified;denies needs/concerns at this time    Anticipated Changes Related to Illness none    Equipment Needed After Discharge none    Provided Post Acute Provider List? N/A    Provided Post Acute Provider Quality & Resource List? N/A    Patient's Choice of Community Agency(s) No skilled needs noted                   Discharge Plan       Row Name 05/08/25 9698       Plan    Plan Plans discharge home with assist of family.    Patient/Family in Agreement with Plan yes    Plan Comments Spoke with patient and spouse at bedside to complete initial assessment. Confirmed information on facesheet. PCP: SHERWIN Hernandez and Pharmacy: [facility] ([address]). Patient lives with spouse and 2 sons (ages 9 & 17) in a single level house with 3 YOEL/hand rail on left side. Denies difficulty affording medications or transportation concerns. States she's never used HH or been to SNF in the past. Plans discharge home with assist of family. No needs noted. Family to transport.....[facility]                    Expected Discharge Date and Time       Expected Discharge Date Expected Discharge Time    May 9, 2025            Demographic Summary    No documentation.                  Functional Status    No documentation.                  Psychosocial    No documentation.                  Abuse/Neglect    No documentation.                  Legal    No documentation.                  Substance Abuse    No documentation.                  Patient Forms    No documentation.                     Nereyda Gutierrez RN

## 2025-05-09 ENCOUNTER — APPOINTMENT (OUTPATIENT)
Dept: CARDIOLOGY | Facility: HOSPITAL | Age: 45
End: 2025-05-09
Payer: COMMERCIAL

## 2025-05-09 LAB
ALBUMIN SERPL-MCNC: 3.6 G/DL (ref 3.5–5.2)
ANION GAP SERPL CALCULATED.3IONS-SCNC: 9.4 MMOL/L (ref 5–15)
AORTIC ARCH: 1.9 CM
AORTIC DIMENSIONLESS INDEX: 0.77 (DI)
ASCENDING AORTA: 2.4 CM
AV MEAN PRESS GRAD SYS DOP V1V2: 3 MMHG
AV VMAX SYS DOP: 110 CM/SEC
BH CV ECHO MEAS - ACS: 2.04 CM
BH CV ECHO MEAS - AO MAX PG: 4.8 MMHG
BH CV ECHO MEAS - AO ROOT DIAM: 2.42 CM
BH CV ECHO MEAS - AO V2 VTI: 21.3 CM
BH CV ECHO MEAS - AVA(I,D): 2.9 CM2
BH CV ECHO MEAS - EDV(CUBED): 68.9 ML
BH CV ECHO MEAS - EDV(MOD-SP2): 74 ML
BH CV ECHO MEAS - EDV(MOD-SP4): 77 ML
BH CV ECHO MEAS - EF(MOD-SP2): 62.2 %
BH CV ECHO MEAS - EF(MOD-SP4): 68.8 %
BH CV ECHO MEAS - ESV(CUBED): 22.2 ML
BH CV ECHO MEAS - ESV(MOD-SP2): 28 ML
BH CV ECHO MEAS - ESV(MOD-SP4): 24 ML
BH CV ECHO MEAS - FS: 31.5 %
BH CV ECHO MEAS - IVS/LVPW: 1 CM
BH CV ECHO MEAS - IVSD: 0.7 CM
BH CV ECHO MEAS - LAT PEAK E' VEL: 16.3 CM/SEC
BH CV ECHO MEAS - LV DIASTOLIC VOL/BSA (35-75): 46.2 CM2
BH CV ECHO MEAS - LV MASS(C)D: 81.7 GRAMS
BH CV ECHO MEAS - LV MAX PG: 3.2 MMHG
BH CV ECHO MEAS - LV MEAN PG: 2 MMHG
BH CV ECHO MEAS - LV SYSTOLIC VOL/BSA (12-30): 14.4 CM2
BH CV ECHO MEAS - LV V1 MAX: 89.7 CM/SEC
BH CV ECHO MEAS - LV V1 VTI: 16.4 CM
BH CV ECHO MEAS - LVIDD: 4.1 CM
BH CV ECHO MEAS - LVIDS: 2.8 CM
BH CV ECHO MEAS - LVOT AREA: 3.7 CM2
BH CV ECHO MEAS - LVOT DIAM: 2.17 CM
BH CV ECHO MEAS - LVPWD: 0.7 CM
BH CV ECHO MEAS - MED PEAK E' VEL: 11 CM/SEC
BH CV ECHO MEAS - MV A DUR: 0.11 SEC
BH CV ECHO MEAS - MV A MAX VEL: 43.4 CM/SEC
BH CV ECHO MEAS - MV DEC SLOPE: 589.2 CM/SEC2
BH CV ECHO MEAS - MV DEC TIME: 0.15 SEC
BH CV ECHO MEAS - MV E MAX VEL: 64.1 CM/SEC
BH CV ECHO MEAS - MV E/A: 1.48
BH CV ECHO MEAS - MV MAX PG: 3.4 MMHG
BH CV ECHO MEAS - MV MEAN PG: 1.5 MMHG
BH CV ECHO MEAS - MV P1/2T: 45.6 MSEC
BH CV ECHO MEAS - MV V2 VTI: 23.7 CM
BH CV ECHO MEAS - MVA(P1/2T): 4.8 CM2
BH CV ECHO MEAS - MVA(VTI): 2.6 CM2
BH CV ECHO MEAS - PA ACC TIME: 0.1 SEC
BH CV ECHO MEAS - PA V2 MAX: 93.1 CM/SEC
BH CV ECHO MEAS - PULM A REVS DUR: 0.08 SEC
BH CV ECHO MEAS - PULM A REVS VEL: 21.9 CM/SEC
BH CV ECHO MEAS - PULM DIAS VEL: 39.1 CM/SEC
BH CV ECHO MEAS - PULM S/D: 1.14
BH CV ECHO MEAS - PULM SYS VEL: 44.5 CM/SEC
BH CV ECHO MEAS - RAP SYSTOLE: 3 MMHG
BH CV ECHO MEAS - RV MAX PG: 2.8 MMHG
BH CV ECHO MEAS - RV V1 MAX: 83.1 CM/SEC
BH CV ECHO MEAS - RV V1 VTI: 16.4 CM
BH CV ECHO MEAS - RVSP: 28 MMHG
BH CV ECHO MEAS - SUP REN AO DIAM: 1.5 CM
BH CV ECHO MEAS - SV(LVOT): 60.7 ML
BH CV ECHO MEAS - SV(MOD-SP2): 46 ML
BH CV ECHO MEAS - SV(MOD-SP4): 53 ML
BH CV ECHO MEAS - SVI(LVOT): 36.5 ML/M2
BH CV ECHO MEAS - SVI(MOD-SP2): 27.6 ML/M2
BH CV ECHO MEAS - SVI(MOD-SP4): 31.8 ML/M2
BH CV ECHO MEAS - TAPSE (>1.6): 2.5 CM
BH CV ECHO MEAS - TR MAX PG: 25 MMHG
BH CV ECHO MEASUREMENTS AVERAGE E/E' RATIO: 4.7
BH CV ECHO SHUNT ASSESSMENT PERFORMED (HIDDEN SCRIPTING): 1
BH CV XLRA - RV BASE: 3.4 CM
BH CV XLRA - RV LENGTH: 6.8 CM
BH CV XLRA - RV MID: 2.23 CM
BH CV XLRA - TDI S': 13.7 CM/SEC
BUN SERPL-MCNC: 7 MG/DL (ref 6–20)
BUN/CREAT SERPL: 11.1 (ref 7–25)
CALCIUM SPEC-SCNC: 8.5 MG/DL (ref 8.6–10.5)
CHLORIDE SERPL-SCNC: 107 MMOL/L (ref 98–107)
CO2 SERPL-SCNC: 23.6 MMOL/L (ref 22–29)
CREAT SERPL-MCNC: 0.63 MG/DL (ref 0.57–1)
DEPRECATED RDW RBC AUTO: 38.7 FL (ref 37–54)
EGFRCR SERPLBLD CKD-EPI 2021: 112.3 ML/MIN/1.73
ERYTHROCYTE [DISTWIDTH] IN BLOOD BY AUTOMATED COUNT: 11.6 % (ref 12.3–15.4)
GLUCOSE SERPL-MCNC: 86 MG/DL (ref 65–99)
HCT VFR BLD AUTO: 29.2 % (ref 34–46.6)
HGB BLD-MCNC: 9.8 G/DL (ref 12–15.9)
LEFT ATRIUM VOLUME INDEX: 21.7 ML/M2
LV EF BIPLANE MOD: 64.2 %
MCH RBC QN AUTO: 31.2 PG (ref 26.6–33)
MCHC RBC AUTO-ENTMCNC: 33.6 G/DL (ref 31.5–35.7)
MCV RBC AUTO: 93 FL (ref 79–97)
PHOSPHATE SERPL-MCNC: 3.9 MG/DL (ref 2.5–4.5)
PLATELET # BLD AUTO: 200 10*3/MM3 (ref 140–450)
PMV BLD AUTO: 10.3 FL (ref 6–12)
POTASSIUM SERPL-SCNC: 4.1 MMOL/L (ref 3.5–5.2)
RBC # BLD AUTO: 3.14 10*6/MM3 (ref 3.77–5.28)
SINUS: 2.6 CM
SODIUM SERPL-SCNC: 140 MMOL/L (ref 136–145)
STJ: 2.2 CM
WBC NRBC COR # BLD AUTO: 4.39 10*3/MM3 (ref 3.4–10.8)

## 2025-05-09 PROCEDURE — 99231 SBSQ HOSP IP/OBS SF/LOW 25: CPT | Performed by: NEUROLOGICAL SURGERY

## 2025-05-09 PROCEDURE — 85027 COMPLETE CBC AUTOMATED: CPT | Performed by: INTERNAL MEDICINE

## 2025-05-09 PROCEDURE — 25010000002 ONDANSETRON PER 1 MG: Performed by: NEUROLOGICAL SURGERY

## 2025-05-09 PROCEDURE — 93306 TTE W/DOPPLER COMPLETE: CPT

## 2025-05-09 PROCEDURE — G0378 HOSPITAL OBSERVATION PER HR: HCPCS

## 2025-05-09 PROCEDURE — 93306 TTE W/DOPPLER COMPLETE: CPT | Performed by: INTERNAL MEDICINE

## 2025-05-09 PROCEDURE — 80069 RENAL FUNCTION PANEL: CPT | Performed by: INTERNAL MEDICINE

## 2025-05-09 PROCEDURE — 25010000002 PROCHLORPERAZINE 10 MG/2ML SOLUTION

## 2025-05-09 PROCEDURE — 96376 TX/PRO/DX INJ SAME DRUG ADON: CPT

## 2025-05-09 PROCEDURE — 99214 OFFICE O/P EST MOD 30 MIN: CPT | Performed by: PHYSICIAN ASSISTANT

## 2025-05-09 RX ORDER — RIBOFLAVIN (VITAMIN B2) 100 MG
400 TABLET ORAL DAILY
Status: DISCONTINUED | OUTPATIENT
Start: 2025-05-10 | End: 2025-05-09

## 2025-05-09 RX ORDER — RIBOFLAVIN (VITAMIN B2) 100 MG
100 TABLET ORAL DAILY
Status: DISCONTINUED | OUTPATIENT
Start: 2025-05-09 | End: 2025-05-10 | Stop reason: HOSPADM

## 2025-05-09 RX ORDER — PROCHLORPERAZINE EDISYLATE 5 MG/ML
10 INJECTION INTRAMUSCULAR; INTRAVENOUS EVERY 8 HOURS PRN
Status: DISCONTINUED | OUTPATIENT
Start: 2025-05-09 | End: 2025-05-10 | Stop reason: HOSPADM

## 2025-05-09 RX ADMIN — ONDANSETRON 4 MG: 2 INJECTION, SOLUTION INTRAMUSCULAR; INTRAVENOUS at 05:32

## 2025-05-09 RX ADMIN — Medication 10 ML: at 09:21

## 2025-05-09 RX ADMIN — PROCHLORPERAZINE EDISYLATE 10 MG: 5 INJECTION, SOLUTION INTRAMUSCULAR; INTRAVENOUS at 09:18

## 2025-05-09 RX ADMIN — Medication 10 ML: at 21:12

## 2025-05-09 RX ADMIN — ATORVASTATIN CALCIUM 40 MG: 20 TABLET, FILM COATED ORAL at 22:30

## 2025-05-09 RX ADMIN — ONDANSETRON 4 MG: 2 INJECTION, SOLUTION INTRAMUSCULAR; INTRAVENOUS at 21:08

## 2025-05-09 RX ADMIN — MUPIROCIN 1 APPLICATION: 20 OINTMENT TOPICAL at 21:12

## 2025-05-09 RX ADMIN — HYDROCODONE BITARTRATE AND ACETAMINOPHEN 2 TABLET: 5; 325 TABLET ORAL at 05:32

## 2025-05-09 RX ADMIN — Medication 100 MG: at 12:16

## 2025-05-09 RX ADMIN — ASPIRIN 81 MG: 81 TABLET, COATED ORAL at 09:18

## 2025-05-09 RX ADMIN — MUPIROCIN 1 APPLICATION: 20 OINTMENT TOPICAL at 09:23

## 2025-05-09 RX ADMIN — HYDROCODONE BITARTRATE AND ACETAMINOPHEN 2 TABLET: 5; 325 TABLET ORAL at 18:20

## 2025-05-09 RX ADMIN — HYDROCODONE BITARTRATE AND ACETAMINOPHEN 2 TABLET: 5; 325 TABLET ORAL at 12:16

## 2025-05-09 NOTE — PROGRESS NOTES
Neurosurgery progress note    Postprocedure day #2 s/p cerebral angiogram with embolization of a 6 mm ophthalmic segment aneurysm    Subjective: No events reported overnight.  Continues to have mild to moderate headaches.    Objective:  Vitals:    05/08/25 1551 05/08/25 2053 05/08/25 2356 05/09/25 0425   BP: 108/62 113/78 114/75 99/55   BP Location: Right arm Right arm Right arm Right arm   Patient Position: Lying Lying Lying Lying   Pulse: 77 80 96 75   Resp: 16 16 16 16   Temp: 98.1 °F (36.7 °C) 98.4 °F (36.9 °C) 98.4 °F (36.9 °C) 98.4 °F (36.9 °C)   TempSrc: Oral Oral Oral Oral   SpO2: 100% 100% 100% 100%   Weight:       Height:           Physical exam  Awake, alert, oriented x3  Pupils equal round reactive to light  Extraocular muscles intact  Face symmetric  Speech is fluent and clear  No pronator drift  Motor exam  Bilateral deltoids 5/5, bilateral biceps 5/5, bilateral triceps 5/5, bilateral wrist extension 5/5 bilateral hand  5/5  Bilateral hip flexion 5/5, bilateral knee extension 5/5, bilateral DF/PF 5/5  No clonus  No Khalida's reflex  Gait deferred  Able to detect  light touch in all 4 extremities    Assessment/plan   45-year-old female post procedure day #2 s/p successful embolization of a 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  Denies any new neurologic symptoms.  A follow-up MRI was ordered for further evaluation by neurology.  The MRI shows a tiny area of diffusion restriction in the left frontal lobe.  This is likely related to the recent intervention.  Silent lesions are frequently seen after angiograms especially interventions.  She is currently asymptomatic.  I will plan to see her back in 6 weeks with a repeat CTA to evaluate her progress  -No further neurosurgical intervention needed at this time

## 2025-05-09 NOTE — PLAN OF CARE
Goal Outcome Evaluation:  Plan of Care Reviewed With: patient        Progress: no change  Outcome Evaluation: Pt alert and oriented; NIH-o; right groin dressing intact; headache remains; prn iv/po meds given; nausea meds given x2; SR/ST;RA; family at bedside; echo needs to be done; wctm

## 2025-05-09 NOTE — SIGNIFICANT NOTE
05/09/25 1300   Communication Assessment/Intervention   Session Not Performed other (see comments)  (New orders received per stroke protocol. No acute need for speech therapy at this time, per RN. Will s/o. Please re-consult as warranted.)

## 2025-05-09 NOTE — SIGNIFICANT NOTE
05/09/25 1231   OTHER   Discipline occupational therapist   Rehab Time/Intention   Session Not Performed other (see comments)  ([facility] 24 pt has been up ad grant, denies any OT needs. Denies any deficits w/ mobility. DC OT orders)   Therapy Assessment/Plan (PT)   Criteria for Skilled Interventions Met (PT) does not meet criteria for skilled intervention

## 2025-05-09 NOTE — PLAN OF CARE
Goal Outcome Evaluation:  Plan of Care Reviewed With: patient, spouse        Progress: no change  Outcome Evaluation: Neuro exam & VSS. Pt with brief episode of R facial tingling & R hand numbness. Neurology paged. Symptoms resolved, neurology notified of symptoms and resolution. Currenty NIH 0.

## 2025-05-09 NOTE — PROGRESS NOTES
"DOS: 2025  NAME: Ekaterina Ortez   : 1980  PCP: Provider, No Known  Chief Complaint   Patient presents with    Headache       Chief complaint: headache  Subjective: headache is 5/10.  Due to start menses soon and migraines are typical then.  Usually takes ibuprofen around this time.  No vision changes to report    Objective:  Vital signs: BP 99/55 (BP Location: Right arm, Patient Position: Lying)   Pulse 75   Temp 98.4 °F (36.9 °C) (Oral)   Resp 16   Ht 167.6 cm (66\")   Wt 59.3 kg (130 lb 11.7 oz)   LMP 2025 (Approximate)   SpO2 100%   BMI 21.10 kg/m²      Gen: NAD, vitals reviewed  MS: oriented x3, recent/remote memory intact, normal attention/concentration, language intact, no neglect.  CN: visual acuity grossly normal, PERRL, EOMI, no facial droop, no dysarthria  Motor: 5/5 throughout upper and lower extremities, normal tone  Sensory: intact to light touch all 4 ext.  Coordination: intact    ROS:  No weakness, numbness  No fevers, chills    Scheduled Medications:aspirin, 81 mg, Oral, Daily  atorvastatin, 40 mg, Oral, Nightly  diphenhydrAMINE, 25 mg, Intravenous, Once  mupirocin, 1 Application, Each Nare, BID  sodium chloride, 10 mL, Intravenous, Q12H  sodium chloride, 10 mL, Intravenous, Q12H      Infusions:    PRN Medications:    Calcium Replacement - Follow Nurse / BPA Driven Protocol    diphenhydrAMINE    HYDROcodone-acetaminophen    Magnesium Standard Dose Replacement - Follow Nurse / BPA Driven Protocol    Morphine    nitroglycerin    ondansetron    ondansetron ODT **OR** [DISCONTINUED] ondansetron    Phosphorus Replacement - Follow Nurse / BPA Driven Protocol    Potassium Replacement - Follow Nurse / BPA Driven Protocol    [COMPLETED] Insert Peripheral IV **AND** sodium chloride    sodium chloride    sodium chloride    sodium chloride    sodium chloride    Laboratory results:  Lab Results   Component Value Date    GLUCOSE 86 2025    CALCIUM 8.5 (L) 2025     " 05/09/2025    K 4.1 05/09/2025    CO2 23.6 05/09/2025     05/09/2025    BUN 7 05/09/2025    CREATININE 0.63 05/09/2025    BCR 11.1 05/09/2025    ANIONGAP 9.4 05/09/2025     Lab Results   Component Value Date    WBC 4.39 05/09/2025    HGB 9.8 (L) 05/09/2025    HCT 29.2 (L) 05/09/2025    MCV 93.0 05/09/2025     05/09/2025     Lab Results   Component Value Date     (H) 05/08/2025         Lab 05/08/25  1046   HEMOGLOBIN A1C 4.70*        Review of labs:     Review and interpretation of imaging:     MRI Brain With & Without Contrast  Result Date: 5/8/2025  MRI BRAIN W WO CONTRAST-  HISTORY:  Headache; R51.9-Headache, unspecified; R51.9-Headache, unspecified; I67.1-Cerebral aneurysm, nonruptured; M54.81-Occipital neuralgia  COMPARISON: Comparison is made to the CT examination of the head from 2/12/2025, CT angiogram of 5/6/2025 and the CT examinations of the head performed on 5/7/2005 8/2025.  FINDINGS: The diffusion sequence demonstrates an area of increased signal intensity involving the central white matter of the left frontal lobe consistent with a small acute infarct. It measures 5 mm in size. There is expected flow void in the basilar artery and in the distal aspects of the internal carotid arteries on the axial T2 sequence. There is no evidence of hydrocephalus or of abnormal extra-axial fluid. After contrast administration, a small nodular area of enhancement is appreciated involving the subcortical white matter of the right frontal lobe in the opercular region measuring 1.5 mm in size. No convincing abnormal enhancement at the same location was appreciated on the CT angiogram of the head performed on 5/6/2025.      1.  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 2.  5 mm acute infarct is appreciated involving the white matter of the left frontal lobe anteriorly and centrally. 3.  A 1.5 mm area of enhancement is increased involving the  subcortical white matter of the operculum on the right anteriorly. The etiology is uncertain. This may represent a small area of capillary telangiectasia or potentially related to a small subacute infarct or tiny developmental venous anomaly. Should a prior MRI examination of the brain with and without contrast be made available for comparison, I would be more than happy to compare the studies and to generate a supplemental report. Otherwise, a short-term follow-up MRI examination of the brain with and without contrast using a tumor protocol would be recommended in order to assess stability.  This report was finalized on 5/8/2025 3:22 PM by Dr. Wily Almanza M.D on Workstation: [id number]      CT Head Without Contrast  Result Date: 5/8/2025  Patient: VARGAS ROBERTS  Time Out: 05:03 Exam(s): CT HEAD Without Contrast EXAM:   CT Head Without Intravenous Contrast CLINICAL HISTORY:    Reason for exam: Post op WEB embolization. TECHNIQUE:   Axial computed tomography images of the head brain without intravenous contrast.  CTDI is 47.6 mGy and DLP is 821.3 mGy-cm.  This CT exam was performed according to the principle of ALARA (As Low As Reasonably Achievable) by using one or more of the following dose reduction techniques: automated exposure control, adjustment of the mA and or kV according to patient size, and or use of iterative reconstruction technique. COMPARISON:   No relevant prior studies available. FINDINGS:   Brain:  No hemorrhage or mass effect.   Ventricles:  No hydrocephalus.   Bones joints:  Unremarkable.   Soft tissues:  Unremarkable.   Sinuses:  No air fluid level.   Mastoid air cells:  Clear. IMPRESSION:       No acute hemorrhage, hydrocephalus, or mass effect.     Electronically signed by Yary Portillo MD on 05-08-25 at 0503    CT Head Without Contrast  Result Date: 5/7/2025  CT HEAD WO CONTRAST-  HISTORY:  eval for post op changes; R51.9-Headache, unspecified; I67.1-Cerebral aneurysm, nonruptured;  M54.81-Occipital neuralgia  COMPARISON: CT head 5/6/2025  FINDINGS: The patient has undergone endovascular embolization of a left paraclinoid aneurysm. There is no evidence of infarction, hemorrhage, hydrocephalus or of abnormal extra-axial fluid. Further evaluation could be performed with MRI examination of the brain as indicated.      Radiation dose reduction techniques were utilized, including automated exposure modulation based on body size.  This report was finalized on 5/7/2025 2:29 PM by Dr. Wily Almanza M.D on Workstation: [id number]      CT Angiogram Head  Result Date: 5/6/2025  HEAD CT WITHOUT CONTRAST, HEAD & NECK CTA WITH CONTRAST  INDICATION: Worsening headache.  TECHNIQUE: Axial images were acquired from the skull base to vertex without contrast, including multiplanar reformats, per standard departmental protocol , followed by CT angiogram of the head and neck with IV contrast. 3-D postprocessing was performed and reviewed.  Evaluation for a significant carotid arterial stenosis is based on the NASCET criteria.  Radiation dose reduction techniques were utilized, including automated exposure control, and exposure modulation based on body size.  COMPARISON: None available.  FINDINGS:  Head CT: There is no CT evidence of acute intracranial hemorrhage, mass, or infarct. There is no hydrocephalus or extra-axial fluid collection. Brain parenchymal density is within normal limits.  CTA neck: There is a normal aortic arch branching pattern without proximal great vessel stenosis. Both vertebral arteries are patent throughout the neck, and supply the basilar artery.  Both common carotid arteries are normally patent. There is 0% stenosis in both internal carotid arteries.  CTA head:  There is symmetric distal intracranial runoff in the anterior, middle, and posterior cerebral artery territories.  There is no evidence of high-grade intracranial flow-limiting stenosis or branch vessel occlusion. There is a left  ICA 7 mm paraclinoid aneurysm. No other aneurysm is seen. The dural venous sinuses appear normal.  Extravascular structures: There is no intracranial mass or abnormal enhancement.   The extracranial and cervical soft tissue structures show no acute abnormality.  The visualized lung apices show no acute abnormality.  There is no acute bony abnormality.       Normal negative unenhanced head CT.  There is 0% stenosis in both internal carotid arteries. Both vertebral arteries are patent throughout the neck.  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 No other aneurysm is seen.  This report was finalized on 5/6/2025 10:39 PM by Dr. Arturo Zuluaga M.D on Workstation: [id number]      CT Angiogram Carotids  Result Date: 5/6/2025  HEAD CT WITHOUT CONTRAST, HEAD & NECK CTA WITH CONTRAST  INDICATION: Worsening headache.  TECHNIQUE: Axial images were acquired from the skull base to vertex without contrast, including multiplanar reformats, per standard departmental protocol , followed by CT angiogram of the head and neck with IV contrast. 3-D postprocessing was performed and reviewed.  Evaluation for a significant carotid arterial stenosis is based on the NASCET criteria.  Radiation dose reduction techniques were utilized, including automated exposure control, and exposure modulation based on body size.  COMPARISON: None available.  FINDINGS:  Head CT: There is no CT evidence of acute intracranial hemorrhage, mass, or infarct. There is no hydrocephalus or extra-axial fluid collection. Brain parenchymal density is within normal limits.  CTA neck: There is a normal aortic arch branching pattern without proximal great vessel stenosis. Both vertebral arteries are patent throughout the neck, and supply the basilar artery.  Both common carotid arteries are normally patent. There is 0% stenosis in both internal carotid arteries.   CTA head:  There is symmetric distal intracranial runoff in the anterior, middle, and posterior cerebral artery territories.  There is no evidence of high-grade intracranial flow-limiting stenosis or branch vessel occlusion. There is a left ICA 7 mm paraclinoid aneurysm. No other aneurysm is seen. The dural venous sinuses appear normal.  Extravascular structures: There is no intracranial mass or abnormal enhancement.   The extracranial and cervical soft tissue structures show no acute abnormality.  The visualized lung apices show no acute abnormality.  There is no acute bony abnormality.       Normal negative unenhanced head CT.  There is 0% stenosis in both internal carotid arteries. Both vertebral arteries are patent throughout the neck.  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 No other aneurysm is seen.  This report was finalized on 5/6/2025 10:39 PM by Dr. Arturo Zuluaga M.D on Workstation: [id number]          Workup to date:    Diagnoses:  L ophthalmic aneurysm s/p coiliing  L frontal stroke  L ICA stenosis  Migraine headaches    Impression: 45-year-old right-handed female with prior history of migraine headaches who came to the ER on 5/7 with a headache more severe than her usual with described positional changes and a whooshing sound.  Neurowork-up including diagnostic angio showed severe left ICA stenosis and 7 mm ophthalmic artery aneurysm for which she underwent coiling.  MRI brain showed a small punctate infarct of the left frontal lobe and nonspecific encephalomalacia of the R operculum.  She was started on aspirin and statin in light of this and her ICA stenosis.  TTE is pending.  She will likely need cardiac monitoring at discharge.  Pertaining to headache treatment she can use tylenol, continue mg, and added b2.  Also she can  samples of nurtec for abortive therapy.  Will follow  peripherally on TTE result-if no abnormality no objection to d/c from neuro standpoint.  Zio at d/c.  She will need f/u with neurosurgery and neurology    I spent at least 50 minutes interviewing, examining, and counseling patient.  I independently reviewed documentation, laboratory and diagnostic findings, external documentation where applicable, and formulated treatment plan which was discussed with the patient.      Thank you for this consultation.  Discussed above plan with neuro attending, Dr. Hamilton who agrees with above plan.  Neurology team is available for concerns or questions.

## 2025-05-10 ENCOUNTER — APPOINTMENT (OUTPATIENT)
Dept: CARDIOLOGY | Facility: HOSPITAL | Age: 45
End: 2025-05-10
Payer: COMMERCIAL

## 2025-05-10 VITALS
SYSTOLIC BLOOD PRESSURE: 111 MMHG | WEIGHT: 130 LBS | HEIGHT: 66 IN | HEART RATE: 88 BPM | DIASTOLIC BLOOD PRESSURE: 70 MMHG | TEMPERATURE: 98.2 F | OXYGEN SATURATION: 100 % | RESPIRATION RATE: 18 BRPM | BODY MASS INDEX: 20.89 KG/M2

## 2025-05-10 LAB
ALBUMIN SERPL-MCNC: 4 G/DL (ref 3.5–5.2)
ANION GAP SERPL CALCULATED.3IONS-SCNC: 12 MMOL/L (ref 5–15)
BUN SERPL-MCNC: 8 MG/DL (ref 6–20)
BUN/CREAT SERPL: 14.3 (ref 7–25)
CALCIUM SPEC-SCNC: 8.8 MG/DL (ref 8.6–10.5)
CHLORIDE SERPL-SCNC: 102 MMOL/L (ref 98–107)
CO2 SERPL-SCNC: 24 MMOL/L (ref 22–29)
CREAT SERPL-MCNC: 0.56 MG/DL (ref 0.57–1)
DEPRECATED RDW RBC AUTO: 38.4 FL (ref 37–54)
EGFRCR SERPLBLD CKD-EPI 2021: 115.6 ML/MIN/1.73
ERYTHROCYTE [DISTWIDTH] IN BLOOD BY AUTOMATED COUNT: 11.4 % (ref 12.3–15.4)
GLUCOSE SERPL-MCNC: 101 MG/DL (ref 65–99)
HCT VFR BLD AUTO: 33 % (ref 34–46.6)
HGB BLD-MCNC: 10.9 G/DL (ref 12–15.9)
MCH RBC QN AUTO: 30.9 PG (ref 26.6–33)
MCHC RBC AUTO-ENTMCNC: 33 G/DL (ref 31.5–35.7)
MCV RBC AUTO: 93.5 FL (ref 79–97)
PHOSPHATE SERPL-MCNC: 3.8 MG/DL (ref 2.5–4.5)
PLATELET # BLD AUTO: 223 10*3/MM3 (ref 140–450)
PMV BLD AUTO: 10.2 FL (ref 6–12)
POTASSIUM SERPL-SCNC: 3.9 MMOL/L (ref 3.5–5.2)
RBC # BLD AUTO: 3.53 10*6/MM3 (ref 3.77–5.28)
SODIUM SERPL-SCNC: 138 MMOL/L (ref 136–145)
WBC NRBC COR # BLD AUTO: 5.78 10*3/MM3 (ref 3.4–10.8)

## 2025-05-10 PROCEDURE — 85027 COMPLETE CBC AUTOMATED: CPT | Performed by: INTERNAL MEDICINE

## 2025-05-10 PROCEDURE — G0378 HOSPITAL OBSERVATION PER HR: HCPCS

## 2025-05-10 PROCEDURE — 80069 RENAL FUNCTION PANEL: CPT | Performed by: INTERNAL MEDICINE

## 2025-05-10 PROCEDURE — 93246 EXT ECG>7D<15D RECORDING: CPT

## 2025-05-10 PROCEDURE — 99214 OFFICE O/P EST MOD 30 MIN: CPT | Performed by: PHYSICIAN ASSISTANT

## 2025-05-10 RX ORDER — ATORVASTATIN CALCIUM 40 MG/1
40 TABLET, FILM COATED ORAL NIGHTLY
Qty: 30 TABLET | Refills: 0 | Status: SHIPPED | OUTPATIENT
Start: 2025-05-10

## 2025-05-10 RX ORDER — PROCHLORPERAZINE EDISYLATE 5 MG/ML
10 INJECTION INTRAMUSCULAR; INTRAVENOUS ONCE
Status: DISCONTINUED | OUTPATIENT
Start: 2025-05-10 | End: 2025-05-10 | Stop reason: HOSPADM

## 2025-05-10 RX ORDER — ASPIRIN 81 MG/1
81 TABLET ORAL DAILY
Qty: 30 TABLET | Refills: 0 | Status: SHIPPED | OUTPATIENT
Start: 2025-05-10

## 2025-05-10 RX ORDER — RIBOFLAVIN (VITAMIN B2) 100 MG
100 TABLET ORAL DAILY
Qty: 30 EACH | Refills: 0 | Status: SHIPPED | OUTPATIENT
Start: 2025-05-10

## 2025-05-10 RX ADMIN — Medication 100 MG: at 09:47

## 2025-05-10 RX ADMIN — ASPIRIN 81 MG: 81 TABLET, COATED ORAL at 09:47

## 2025-05-10 RX ADMIN — HYDROCODONE BITARTRATE AND ACETAMINOPHEN 2 TABLET: 5; 325 TABLET ORAL at 04:13

## 2025-05-10 NOTE — CASE MANAGEMENT/SOCIAL WORK
Case Management Discharge Note      Final Note: home    Provided Post Acute Provider List?: N/A  Provided Post Acute Provider Quality & Resource List?: N/A    Selected Continued Care - Discharged on 5/10/2025 Admission date: 5/6/2025 - Discharge disposition: Home or Self Care      Destination    No services have been selected for the patient.                Durable Medical Equipment    No services have been selected for the patient.                Dialysis/Infusion    No services have been selected for the patient.                Home Medical Care    No services have been selected for the patient.                Therapy    No services have been selected for the patient.                Community Resources    No services have been selected for the patient.                Community & DME    No services have been selected for the patient.                    Transportation Services  Private: Car    Final Discharge Disposition Code: 01 - home or self-care

## 2025-05-10 NOTE — PLAN OF CARE
Goal Outcome Evaluation:  Plan of Care Reviewed With: patient, spouse        Progress: improving  Outcome Evaluation: Neuro exam & VSS. Pt's headache much improved today. Pt discharging home with no needs. AVS reviewed, all questions answered. Meds delivered from pharmacy.

## 2025-05-10 NOTE — NURSING NOTE
"2040: Plan of care discussed at length with patient, including med regimen. Patient stated that she was hesitant to receive first dose of Lipitor, because when she spoke with neurosurgeon this afternoon, she was told that after taking medication, she \"might feel sick after first dose\". Med education provided to patient, patient reported slight nausea. PRN Zofran IVP administered per patient request for c/o nausea, patient agreeable to RN reassessing nausea in 40 minutes & giving Lipitor if patient feels better.  2208: RN stopped in the hallway by patient's spouse. Patient's spouse requested that RN \"cut the dose in half\", RN educated patient/spouse about ordered dosage per MD. RN explained that provider would have to change ordered dosage in order for patient to receive any dose other than what was prescribed. Patient's spouse told RN, \"I think it's worth a shot\". [facility] exchange paged per patient/spouse request.  2228: RN told patient/spouse that [facility] provider was contacted, Lipitor dosage will remain the same, & that patient can decline medication if she does not feel comfortable taking it. Patient stated she was okay with taking Lipitor, med administered without issue.  0342: Patient's spouse requested nursing come to bedside. Patient presents with repeat episode of R hand/R facial numbness & tingling, denies other S/S. Page placed to [facility] exchange.  0402: Patient reassessed at bedside per primary RN, reports full resolution of symptoms. Patient/spouse aware that RN is still awaiting provider response, will make patient aware once [facility] has contacted RN.  0416: Call received from SHAJI Tobar, updated on patient's status. Recommended day shift RN follow up with neurology regarding issue. Patient/spouse updated.  "

## 2025-05-10 NOTE — PROGRESS NOTES
"DOS: 5/10/2025  NAME: Ekaterina Ortez   : 1980  PCP: Provider, No Known  Chief Complaint   Patient presents with    Headache       Chief complaint: headache  Subjective: had significant migraine last night.  There were 2 episodes of hand numbness and face numbness on R lasting about 20 minutes.  No weakness or leg involvement    Objective:  Vital signs: /70 (BP Location: Left arm, Patient Position: Lying)   Pulse 102   Temp 98.3 °F (36.8 °C) (Oral)   Resp 18   Ht 167.6 cm (66\")   Wt 59 kg (130 lb)   LMP 2025 (Approximate)   SpO2 100%   BMI 20.98 kg/m²      Gen: NAD, vitals reviewed  MS: oriented x3, recent/remote memory intact, normal attention/concentration, language intact, no neglect.  CN: visual acuity grossly normal, PERRL, EOMI, no facial droop, no dysarthria  Motor: 5/5 throughout upper and lower extremities, normal tone  Sensory: intact to light touch all 4 ext.  Coordination: intact    ROS:  No weakness, numbness  No fevers, chills    Scheduled Medications:aspirin, 81 mg, Oral, Daily  atorvastatin, 40 mg, Oral, Nightly  diphenhydrAMINE, 25 mg, Intravenous, Once  mupirocin, 1 Application, Each Nare, BID  prochlorperazine, 10 mg, Intravenous, Once  sodium chloride, 10 mL, Intravenous, Q12H  sodium chloride, 10 mL, Intravenous, Q12H  valproate sodium, 1,000 mg, Intravenous, Once  Vitamin B-2, 100 mg, Oral, Daily      Infusions:    PRN Medications:    Calcium Replacement - Follow Nurse / BPA Driven Protocol    diphenhydrAMINE    HYDROcodone-acetaminophen    Magnesium Standard Dose Replacement - Follow Nurse / BPA Driven Protocol    Morphine    nitroglycerin    ondansetron    ondansetron ODT **OR** [DISCONTINUED] ondansetron    Phosphorus Replacement - Follow Nurse / BPA Driven Protocol    Potassium Replacement - Follow Nurse / BPA Driven Protocol    prochlorperazine    [COMPLETED] Insert Peripheral IV **AND** sodium chloride    sodium chloride    sodium chloride    sodium chloride   "  sodium chloride    Laboratory results:  Lab Results   Component Value Date    GLUCOSE 101 (H) 05/10/2025    CALCIUM 8.8 05/10/2025     05/10/2025    K 3.9 05/10/2025    CO2 24.0 05/10/2025     05/10/2025    BUN 8 05/10/2025    CREATININE 0.56 (L) 05/10/2025    BCR 14.3 05/10/2025    ANIONGAP 12.0 05/10/2025     Lab Results   Component Value Date    WBC 5.78 05/10/2025    HGB 10.9 (L) 05/10/2025    HCT 33.0 (L) 05/10/2025    MCV 93.5 05/10/2025     05/10/2025     Lab Results   Component Value Date     (H) 05/08/2025         Lab 05/08/25  1046   HEMOGLOBIN A1C 4.70*        Review of labs:     Review and interpretation of imaging:     MRI Brain With & Without Contrast  Result Date: 5/8/2025  MRI BRAIN W WO CONTRAST-  HISTORY:  Headache; R51.9-Headache, unspecified; R51.9-Headache, unspecified; I67.1-Cerebral aneurysm, nonruptured; M54.81-Occipital neuralgia  COMPARISON: Comparison is made to the CT examination of the head from 2/12/2025, CT angiogram of 5/6/2025 and the CT examinations of the head performed on 5/7/2005 8/2025.  FINDINGS: The diffusion sequence demonstrates an area of increased signal intensity involving the central white matter of the left frontal lobe consistent with a small acute infarct. It measures 5 mm in size. There is expected flow void in the basilar artery and in the distal aspects of the internal carotid arteries on the axial T2 sequence. There is no evidence of hydrocephalus or of abnormal extra-axial fluid. After contrast administration, a small nodular area of enhancement is appreciated involving the subcortical white matter of the right frontal lobe in the opercular region measuring 1.5 mm in size. No convincing abnormal enhancement at the same location was appreciated on the CT angiogram of the head performed on 5/6/2025.      1.  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 2.  5 mm acute infarct is appreciated  involving the white matter of the left frontal lobe anteriorly and centrally. 3.  A 1.5 mm area of enhancement is increased involving the subcortical white matter of the operculum on the right anteriorly. The etiology is uncertain. This may represent a small area of capillary telangiectasia or potentially related to a small subacute infarct or tiny developmental venous anomaly. Should a prior MRI examination of the brain with and without contrast be made available for comparison, I would be more than happy to compare the studies and to generate a supplemental report. Otherwise, a short-term follow-up MRI examination of the brain with and without contrast using a tumor protocol would be recommended in order to assess stability.  This report was finalized on 5/8/2025 3:22 PM by Dr. Wily Almanza M.D on Workstation: [id number]      CT Head Without Contrast  Result Date: 5/8/2025  Patient: VARGAS ROBERTS  Time Out: 05:03 Exam(s): CT HEAD Without Contrast EXAM:   CT Head Without Intravenous Contrast CLINICAL HISTORY:    Reason for exam: Post op WEB embolization. TECHNIQUE:   Axial computed tomography images of the head brain without intravenous contrast.  CTDI is 47.6 mGy and DLP is 821.3 mGy-cm.  This CT exam was performed according to the principle of ALARA (As Low As Reasonably Achievable) by using one or more of the following dose reduction techniques: automated exposure control, adjustment of the mA and or kV according to patient size, and or use of iterative reconstruction technique. COMPARISON:   No relevant prior studies available. FINDINGS:   Brain:  No hemorrhage or mass effect.   Ventricles:  No hydrocephalus.   Bones joints:  Unremarkable.   Soft tissues:  Unremarkable.   Sinuses:  No air fluid level.   Mastoid air cells:  Clear. IMPRESSION:       No acute hemorrhage, hydrocephalus, or mass effect.     Electronically signed by Yary Portillo MD on 05-08-25 at 0503    CT Head Without Contrast  Result  Date: 5/7/2025  CT HEAD WO CONTRAST-  HISTORY:  eval for post op changes; R51.9-Headache, unspecified; I67.1-Cerebral aneurysm, nonruptured; M54.81-Occipital neuralgia  COMPARISON: CT head 5/6/2025  FINDINGS: The patient has undergone endovascular embolization of a left paraclinoid aneurysm. There is no evidence of infarction, hemorrhage, hydrocephalus or of abnormal extra-axial fluid. Further evaluation could be performed with MRI examination of the brain as indicated.      Radiation dose reduction techniques were utilized, including automated exposure modulation based on body size.  This report was finalized on 5/7/2025 2:29 PM by Dr. Wily Almanza M.D on Workstation: [id number]      CT Angiogram Head  Result Date: 5/6/2025  HEAD CT WITHOUT CONTRAST, HEAD & NECK CTA WITH CONTRAST  INDICATION: Worsening headache.  TECHNIQUE: Axial images were acquired from the skull base to vertex without contrast, including multiplanar reformats, per standard departmental protocol , followed by CT angiogram of the head and neck with IV contrast. 3-D postprocessing was performed and reviewed.  Evaluation for a significant carotid arterial stenosis is based on the NASCET criteria.  Radiation dose reduction techniques were utilized, including automated exposure control, and exposure modulation based on body size.  COMPARISON: None available.  FINDINGS:  Head CT: There is no CT evidence of acute intracranial hemorrhage, mass, or infarct. There is no hydrocephalus or extra-axial fluid collection. Brain parenchymal density is within normal limits.  CTA neck: There is a normal aortic arch branching pattern without proximal great vessel stenosis. Both vertebral arteries are patent throughout the neck, and supply the basilar artery.  Both common carotid arteries are normally patent. There is 0% stenosis in both internal carotid arteries.  CTA head:  There is symmetric distal intracranial runoff in the anterior, middle, and posterior  cerebral artery territories.  There is no evidence of high-grade intracranial flow-limiting stenosis or branch vessel occlusion. There is a left ICA 7 mm paraclinoid aneurysm. No other aneurysm is seen. The dural venous sinuses appear normal.  Extravascular structures: There is no intracranial mass or abnormal enhancement.   The extracranial and cervical soft tissue structures show no acute abnormality.  The visualized lung apices show no acute abnormality.  There is no acute bony abnormality.       Normal negative unenhanced head CT.  There is 0% stenosis in both internal carotid arteries. Both vertebral arteries are patent throughout the neck.  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 No other aneurysm is seen.  This report was finalized on 5/6/2025 10:39 PM by Dr. Arturo Zuluaga M.D on Workstation: [id number]      CT Angiogram Carotids  Result Date: 5/6/2025  HEAD CT WITHOUT CONTRAST, HEAD & NECK CTA WITH CONTRAST  INDICATION: Worsening headache.  TECHNIQUE: Axial images were acquired from the skull base to vertex without contrast, including multiplanar reformats, per standard departmental protocol , followed by CT angiogram of the head and neck with IV contrast. 3-D postprocessing was performed and reviewed.  Evaluation for a significant carotid arterial stenosis is based on the NASCET criteria.  Radiation dose reduction techniques were utilized, including automated exposure control, and exposure modulation based on body size.  COMPARISON: None available.  FINDINGS:  Head CT: There is no CT evidence of acute intracranial hemorrhage, mass, or infarct. There is no hydrocephalus or extra-axial fluid collection. Brain parenchymal density is within normal limits.  CTA neck: There is a normal aortic arch branching pattern without proximal great vessel stenosis. Both vertebral arteries are patent throughout the  neck, and supply the basilar artery.  Both common carotid arteries are normally patent. There is 0% stenosis in both internal carotid arteries.  CTA head:  There is symmetric distal intracranial runoff in the anterior, middle, and posterior cerebral artery territories.  There is no evidence of high-grade intracranial flow-limiting stenosis or branch vessel occlusion. There is a left ICA 7 mm paraclinoid aneurysm. No other aneurysm is seen. The dural venous sinuses appear normal.  Extravascular structures: There is no intracranial mass or abnormal enhancement.   The extracranial and cervical soft tissue structures show no acute abnormality.  The visualized lung apices show no acute abnormality.  There is no acute bony abnormality.       Normal negative unenhanced head CT.  There is 0% stenosis in both internal carotid arteries. Both vertebral arteries are patent throughout the neck.  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 No other aneurysm is seen.  This report was finalized on 5/6/2025 10:39 PM by Dr. Arturo Zuluaga M.D on Workstation: [id number]          Workup to date:    Diagnoses:  L ophthalmic aneurysm s/p coiliing  L frontal stroke  L ICA stenosis  Migraine headaches    Impression: 45-year-old right-handed female with prior history of migraine headaches who came to the ER on 5/7 with a headache more severe than her usual with described positional changes and a whooshing sound.  Neurowork-up including diagnostic angio showed severe left ICA stenosis and 7 mm ophthalmic artery aneurysm for which she underwent coiling.  MRI brain showed a small punctate infarct of the left frontal lobe and nonspecific encephalomalacia of the R operculum.  She was started on aspirin and statin in light of this and her ICA stenosis.  TTE is pending.  She will likely need cardiac monitoring at discharge.  Pertaining to  headache treatment she can use tylenol, continue mg, and added b2.  Also she can  samples of nurtec for abortive therapy.      She has transient paresthesias of R face and arm last night.  Sxs are resolved as is headache nearly.  Ddx includes migraine with aura or stroke sxs.  Her mgmt is the same.  Her exam is non focal and reassuring.  Return precautions discussed.     She will f/u in neuro clinic and will send electronic request to schedule      I spent at least 50 minutes interviewing, examining, and counseling patient.  I independently reviewed documentation, laboratory and diagnostic findings, external documentation where applicable, and formulated treatment plan which was discussed with the patient.      Thank you for this consultation.  Discussed above plan with neuro attending, Dr. Hamilton who agrees with above plan.  Neurology team is available for concerns or questions.

## 2025-05-10 NOTE — DISCHARGE SUMMARY
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        Hospital Course     Ms. Ortez is a 45 y.o. female with no significant past medical history who presented to [facility] initially complaining of headache and abnormal vision.  Please see the admitting history and physical for further details.  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      Initial imaging revealed evidence of ophthalmic artery aneurysm.  Neurosurgery was consulted on admission.  The patient underwent embolization of the aneurysm on .  She tolerated this procedure well.  CT scan was stable postprocedure.  The patient was also noted to have left-sided ICA stenosis.  The patient did have an MRI with full results as below.  It revealed evidence of small punctuate infarct in the left frontal lobe and nonspecific encephalomalacia of the right operculum.  Neurosurgery ultimately felt this was most likely related to her recent intervention.  The patient will need neurosurgery follow-up in 6 weeks with repeat CTA at that time.  Neurology was consulted for further evaluation.  The patient underwent an echocardiogram with results as below.  EF was normal and there was no PFO.  The patient is being discharged on aspirin and statin.  She is also being discharged with a Holter monitor.  She will need outpatient ophthalmology appointment.  With regards to her chronic headaches, the  patient can continue using Tylenol and magnesium supplementation.  Vitamin B12 was also added.  Neurology discussed with patient picking up samples of Nurtec in their clinic for abortive therapy as well.  Overall, the patient had good clinical improvement.  All consulting services were agreeable with discharge home.  Neurology will arrange follow-up in their office.  She should follow-up with her primary care provider within 1 week.  She was counseled on return precautions and expressed understanding.  She is being discharged in satisfactory condition.    Results for orders placed during the hospital encounter of 05/06/25    Adult Transthoracic Echo Complete W/ Cont if Necessary Per Protocol    Interpretation Summary    Left ventricular systolic function is normal. Left ventricular ejection fraction appears to be 61 - 65%.    Left ventricular diastolic function was normal.    Normal right ventricular cavity size and systolic function noted.    Saline test results are negative.    Mild tricuspid valve regurgitation is present.    Calculated right ventricular systolic pressure from tricuspid regurgitation is 28 mmHg.    There is no evidence of pericardial effusion    Day of Discharge     Subjective:  No acute events overnight.  Patient feeling well this morning.  Denies headache, chest pain, shortness of breath.  She did have an episode of numbness/tingling in her right face and arm yesterday.  Discussed with neurology and they went back to evaluate the patient.  Symptoms could be either consistent with a TIA or migraine with aura, however her management would be the same.  Given that symptoms have resolved, they are okay with discharge home on the above treatment.  Patient is eager for discharge home.    Physical Exam:  Temp:  [98.1 °F (36.7 °C)-98.7 °F (37.1 °C)] 98.2 °F (36.8 °C)  Heart Rate:  [] 88  Resp:  [18] 18  BP: (111-124)/(67-75) 111/70  Body mass index is 20.98 kg/m².  Physical Exam  General: Alert,  no acute distress.  Sitting up in bed.  Answers questions appropriately.  ENT: No conjunctival injection or scleral icterus. Moist mucous membranes.   Neuro: Eyes open and moving in all directions, strength grossly normal in all extremities, face symmetric, no focal deficits.   Lungs: Clear to auscultation bilaterally. No wheeze or crackles. No distress.   Heart: RRR, no murmurs. No edema.  Abdomen: Soft, non-tender, non-distended. Normal bowel sounds.  Ext: Warm and well-perfused. No edema.   Skin: No rashes or lesions. IV site without swelling or erythema.     Consultants     Consult Orders (all) (From admission, onward)       Start     Ordered    05/08/25 1738  Notify Stroke Coordinator  Once,   Status:  Canceled        Provider:  (Not yet assigned)    05/08/25 1737 05/08/25 1738  Inpatient Case Management  Consult  Once        Provider:  (Not yet assigned)    05/08/25 1737 05/08/25 1738  Inpatient Diabetes Educator Consult  Once,   Status:  Canceled        Provider:  (Not yet assigned)    05/08/25 1737    05/08/25 1045  Inpatient Hospitalist Consult  Once        Specialty:  Hospitalist  Provider:  Josue Martin MD    05/08/25 1044    05/07/25 1500  Inpatient Pulmonology Consult  Once,   Status:  Canceled        Specialty:  Pulmonary Disease  Provider:  Manoj Lira MD    05/07/25 1459    05/07/25 0702  Inpatient Neurosurgery Consult  IN         Specialty:  Neurosurgery  Provider:  Zacarias Billy MD    05/07/25 0032    05/06/25 2340  Neurosurgery (on-call MD unless specified)  Once        Specialty:  Neurosurgery  Provider:  Jono Rendon MD    05/06/25 2340    05/06/25 2124  Inpatient Neurology Consult General  Once        Specialty:  Neurology  Provider:  Will Hamilton MD    05/06/25 2123                  Procedures     * Surgery not found *    Imaging Results (All)       Procedure Component Value Units Date/Time    MRI Brain With & Without Contrast [[id number]] Collected:  05/08/25 1505     Updated: 05/08/25 1525    Narrative:      MRI BRAIN W WO CONTRAST-     HISTORY:  Headache; R51.9-Headache, unspecified; R51.9-Headache,  unspecified; I67.1-Cerebral aneurysm, nonruptured; M54.81-Occipital  neuralgia     COMPARISON: Comparison is made to the CT examination of the head from  2/12/2025, CT angiogram of 5/6/2025 and the CT examinations of the head  performed on 5/7/2005 8/2025.     FINDINGS: The diffusion sequence demonstrates an area of increased  signal intensity involving the central white matter of the left frontal  lobe consistent with a small acute infarct. It measures 5 mm in size.  There is expected flow void in the basilar artery and in the distal  aspects of the internal carotid arteries on the axial T2 sequence. There  is no evidence of hydrocephalus or of abnormal extra-axial fluid. After  contrast administration, a small nodular area of enhancement is  appreciated involving the subcortical white matter of the right frontal  lobe in the opercular region measuring 1.5 mm in size. No convincing  abnormal enhancement at the same location was appreciated on the CT  angiogram of the head performed on 5/6/2025.       Impression:      1.  The patient is status post endovascular embolization of the left  ophthalmic segment aneurysm.  2.  5 mm acute infarct is appreciated involving the white matter of the  left frontal lobe anteriorly and centrally.  3.  A 1.5 mm area of enhancement is increased involving the subcortical  white matter of the operculum on the right anteriorly. The etiology is  uncertain. This may represent a small area of capillary telangiectasia  or potentially related to a small subacute infarct or tiny developmental  venous anomaly. Should a prior MRI examination of the brain with and  without contrast be made available for comparison, I would be more than  happy to compare the studies and to generate a supplemental report.  Otherwise, a short-term follow-up MRI  examination of the brain with and  without contrast using a tumor protocol would be recommended in order to  assess stability.     This report was finalized on 5/8/2025 3:22 PM by Dr. Wily Almanza M.D  on Workstation: [id number]       CT Head Without Contrast [[id number]] Collected: 05/08/25 0504     Updated: 05/08/25 0504    Narrative:        Patient: VARGAS ROBERTS  Time Out: 05:03  Exam(s): CT HEAD Without Contrast     EXAM:    CT Head Without Intravenous Contrast    CLINICAL HISTORY:     Reason for exam: Post op WEB embolization.    TECHNIQUE:    Axial computed tomography images of the head brain without intravenous   contrast.  CTDI is 47.6 mGy and DLP is 821.3 mGy-cm.  This CT exam was   performed according to the principle of ALARA (As Low As Reasonably   Achievable) by using one or more of the following dose reduction   techniques: automated exposure control, adjustment of the mA and or kV   according to patient size, and or use of iterative reconstruction   technique.    COMPARISON:    No relevant prior studies available.    FINDINGS:    Brain:  No hemorrhage or mass effect.    Ventricles:  No hydrocephalus.    Bones joints:  Unremarkable.    Soft tissues:  Unremarkable.    Sinuses:  No air fluid level.    Mastoid air cells:  Clear.    IMPRESSION:         No acute hemorrhage, hydrocephalus, or mass effect.      Impression:          Electronically signed by Yary Portillo MD on 05-08-25 at 0503    CT Head Without Contrast [[id number]] Collected: 05/07/25 1419     Updated: 05/07/25 1432    Narrative:      CT HEAD WO CONTRAST-     HISTORY:  eval for post op changes; R51.9-Headache, unspecified;  I67.1-Cerebral aneurysm, nonruptured; M54.81-Occipital neuralgia     COMPARISON: CT head 5/6/2025     FINDINGS: The patient has undergone endovascular embolization of a left  paraclinoid aneurysm. There is no evidence of infarction, hemorrhage,  hydrocephalus or of abnormal extra-axial fluid. Further evaluation  could  be performed with MRI examination of the brain as indicated.                 Radiation dose reduction techniques were utilized, including automated  exposure modulation based on body size.     This report was finalized on 5/7/2025 2:29 PM by Dr. Wily Almanza M.D  on Workstation: [id number]       IR Embolization [[id number]] Resulted: 05/07/25 1020     Updated: 05/07/25 1201    CT Angiogram Head [[id number]] Collected: 05/06/25 2228     Updated: 05/06/25 2242    Narrative:      HEAD CT WITHOUT CONTRAST, HEAD & NECK CTA WITH CONTRAST     INDICATION:  Worsening headache.     TECHNIQUE:  Axial images were acquired from the skull base to vertex without  contrast, including multiplanar reformats, per standard departmental  protocol , followed by CT angiogram of the head and neck with IV  contrast. 3-D postprocessing was performed and reviewed.  Evaluation for  a significant carotid arterial stenosis is based on the NASCET criteria.   Radiation dose reduction techniques were utilized, including automated  exposure control, and exposure modulation based on body size.     COMPARISON:  None available.     FINDINGS:     Head CT: There is no CT evidence of acute intracranial hemorrhage, mass,  or infarct. There is no hydrocephalus or extra-axial fluid collection.   Brain parenchymal density is within normal limits.     CTA neck: There is a normal aortic arch branching pattern without  proximal great vessel stenosis. Both vertebral arteries are patent  throughout the neck, and supply the basilar artery.  Both common carotid  arteries are normally patent. There is 0% stenosis in both internal  carotid arteries.     CTA head:  There is symmetric distal intracranial runoff in the  anterior, middle, and posterior cerebral artery territories.  There is  no evidence of high-grade intracranial flow-limiting stenosis or branch  vessel occlusion. There is a left ICA 7 mm paraclinoid aneurysm. No  other aneurysm is seen. The  dural venous sinuses appear normal.     Extravascular structures: There is no intracranial mass or abnormal  enhancement.   The extracranial and cervical soft tissue structures show  no acute abnormality.  The visualized lung apices show no acute  abnormality.  There is no acute bony abnormality.       Impression:         Normal negative unenhanced head CT.      There is 0% stenosis in both internal carotid arteries. Both vertebral  arteries are patent throughout the neck.     Head CTA shows no evidence of intracranial flow-limiting stenosis or  branch vessel occlusion, but there is a 7 mm left paraclinoid ICA  aneurysm. No other aneurysm is seen.     This report was finalized on 5/6/2025 10:39 PM by Dr. Arturo Zuluaga M.D on Workstation: [id number]       CT Angiogram Carotids [[id number]] Collected: 05/06/25 2228     Updated: 05/06/25 2242    Narrative:      HEAD CT WITHOUT CONTRAST, HEAD & NECK CTA WITH CONTRAST     INDICATION:  Worsening headache.     TECHNIQUE:  Axial images were acquired from the skull base to vertex without  contrast, including multiplanar reformats, per standard departmental  protocol , followed by CT angiogram of the head and neck with IV  contrast. 3-D postprocessing was performed and reviewed.  Evaluation for  a significant carotid arterial stenosis is based on the NASCET criteria.   Radiation dose reduction techniques were utilized, including automated  exposure control, and exposure modulation based on body size.     COMPARISON:  None available.     FINDINGS:     Head CT: There is no CT evidence of acute intracranial hemorrhage, mass,  or infarct. There is no hydrocephalus or extra-axial fluid collection.   Brain parenchymal density is within normal limits.     CTA neck: There is a normal aortic arch branching pattern without  proximal great vessel stenosis. Both vertebral arteries are patent  throughout the neck, and supply the basilar artery.  Both common carotid  arteries are  normally patent. There is 0% stenosis in both internal  carotid arteries.     CTA head:  There is symmetric distal intracranial runoff in the  anterior, middle, and posterior cerebral artery territories.  There is  no evidence of high-grade intracranial flow-limiting stenosis or branch  vessel occlusion. There is a left ICA 7 mm paraclinoid aneurysm. No  other aneurysm is seen. The dural venous sinuses appear normal.     Extravascular structures: There is no intracranial mass or abnormal  enhancement.   The extracranial and cervical soft tissue structures show  no acute abnormality.  The visualized lung apices show no acute  abnormality.  There is no acute bony abnormality.       Impression:         Normal negative unenhanced head CT.      There is 0% stenosis in both internal carotid arteries. Both vertebral  arteries are patent throughout the neck.     Head CTA shows no evidence of intracranial flow-limiting stenosis or  branch vessel occlusion, but there is a 7 mm left paraclinoid ICA  aneurysm. No other aneurysm is seen.     This report was finalized on 5/6/2025 10:39 PM by Dr. Arturo Zuluaga M.D on Workstation: [id number]               Results for orders placed during the hospital encounter of 05/06/25    Adult Transthoracic Echo Complete W/ Cont if Necessary Per Protocol    Interpretation Summary    Left ventricular systolic function is normal. Left ventricular ejection fraction appears to be 61 - 65%.    Left ventricular diastolic function was normal.    Normal right ventricular cavity size and systolic function noted.    Saline test results are negative.    Mild tricuspid valve regurgitation is present.    Calculated right ventricular systolic pressure from tricuspid regurgitation is 28 mmHg.    There is no evidence of pericardial effusion    Pertinent Labs     Results from last 7 days   Lab Units 05/10/25  0556 05/09/25  0528 05/08/25  0306 05/07/25  0423   WBC 10*3/mm3 5.78 4.39 9.71 5.90   HEMOGLOBIN  g/dL 10.9* 9.8* 9.5* 10.6*   PLATELETS 10*3/mm3 223 200 211 222     Results from last 7 days   Lab Units 05/10/25  0556 05/09/25 0528 05/08/25  1533 05/08/25 0306 05/07/25  0423   SODIUM mmol/L 138 140  --  139 140   POTASSIUM mmol/L 3.9 4.1 4.2 3.5 3.8   CHLORIDE mmol/L 102 107  --  108* 110*   CO2 mmol/L 24.0 23.6  --  20.0* 22.0   BUN mg/dL 8 7  --  5* 9   CREATININE mg/dL 0.56* 0.63  --  0.56* 0.55*   GLUCOSE mg/dL 101* 86  --  97 103*   EGFR mL/min/1.73 115.6 112.3  --  115.6 116.1     Results from last 7 days   Lab Units 05/10/25  0556 05/09/25  0528 05/08/25 0306 05/06/25  2120   ALBUMIN g/dL 4.0 3.6 3.5 4.5   BILIRUBIN mg/dL  --   --   --  0.3   ALK PHOS U/L  --   --   --  41   AST (SGOT) U/L  --   --   --  16   ALT (SGPT) U/L  --   --   --  15     Results from last 7 days   Lab Units 05/10/25  0556 05/09/25 0528 05/08/25  0306 05/07/25 0423 05/06/25  2120   CALCIUM mg/dL 8.8 8.5* 7.9* 8.4* 9.0   ALBUMIN g/dL 4.0 3.6 3.5  --  4.5   PHOSPHORUS mg/dL 3.8 3.9 3.3  --   --            Results from last 7 days   Lab Units 05/08/25  1046   CHOLESTEROL mg/dL 182   TRIGLYCERIDES mg/dL 108   HDL CHOL mg/dL 54   LDL CHOL mg/dL 109*             Test Results Pending at Discharge     Pending Results       Procedure [Order ID] Specimen - Date/Time    Holter Monitor - 72 Hour Up To 15 Days [[id number]] Resulted: 05/10/25 1018     Updated: 05/10/25 1018    IR Embolization [[id number]] Resulted: 05/07/25 1020     Updated: 05/07/25 1201              Discharge Details        Discharge Medications        New Medications        Instructions Start Date   Aspirin Low Dose 81 MG EC tablet  Generic drug: aspirin   81 mg, Oral, Daily      atorvastatin 40 MG tablet  Commonly known as: LIPITOR   40 mg, Oral, Nightly      vitamin b 2 100 MG tablet   100 mg, Oral, Daily               Allergies   Allergen Reactions    Penicillins Anaphylaxis    Keflex [Cephalexin] Rash       Discharge Disposition:  Home or Self Care      Discharge  Diet:  No active diet order      Discharge Activity: Activity as tolerated      CODE STATUS:    Code Status and Medical Interventions: CPR (Attempt to Resuscitate); Full Support   Ordered at: 05/07/25 0032     Code Status (Patient has no pulse and is not breathing):    CPR (Attempt to Resuscitate)     Medical Interventions (Patient has pulse or is breathing):    Full Support       Future Appointments   Date Time Provider Department Center   6/10/2025  4:00 PM JUS BR[facility] CT 1 BH JUS CT BR None   6/18/2025  1:30 PM Zacarias Billy MD [facility] NS [facility]   9/3/2025  2:45 PM Isela Meng APRN [facility] OB TC LG [facility]      Follow-up Information       Provider, No Known .    Contact information:  [facility] [address] [phone]                             Time Spent on Discharge:  Greater than 30 minutes      Dafne Guerra MD  [facility]  05/10/25  16:58 EDT

## 2025-05-11 ENCOUNTER — NURSE TRIAGE (OUTPATIENT)
Dept: CALL CENTER | Facility: HOSPITAL | Age: 45
End: 2025-05-11
Payer: COMMERCIAL

## 2025-05-11 NOTE — TELEPHONE ENCOUNTER
"Patient has hx of migraines and recent aneurysm repair. Today, patient c/o 8/10 headache with nausea. Reviewed protocol with patient's . Advised to be seen today. Patient's  verbalizes agreement with plan.    Reason for Disposition   [1] SEVERE headache (e.g., excruciating) AND [2] not improved after 2 hours of pain medicine    Additional Information   Negative: Difficult to awaken or acting confused (e.g., disoriented, slurred speech)   Negative: [1] Weakness of the face, arm or leg on one side of the body AND [2] new-onset   Negative: [1] Numbness of the face, arm or leg on one side of the body AND [2] new-onset   Negative: [1] Loss of speech or garbled speech AND [2] new-onset   Negative: Passed out (i.e., lost consciousness, collapsed and was not responding)   Negative: Sounds like a life-threatening emergency to the triager   Negative: Followed a head injury   Negative: Pregnant   Negative: Postpartum (from 0 to 6 weeks after delivery)   Negative: Traumatic Brain Injury (TBI) is suspected   Negative: Unable to walk, or can only walk with assistance (e.g., requires support)   Negative: Stiff neck (can't touch chin to chest)   Negative: Severe pain in one eye   Negative: [1] Other family members (or people in same household) with headaches AND [2] possibility of carbon monoxide exposure   Negative: [1] SEVERE headache (e.g., excruciating) AND [2] \"worst headache\" of life   Negative: [1] SEVERE headache AND [2] sudden-onset (i.e., reaching maximum intensity within seconds to 1 hour)   Negative: [1] SEVERE headache AND [2] fever   Negative: Loss of vision or double vision  (Exception: Same as prior migraines.)   Negative: [1] Fever > 100.0 F (37.8 C) AND [2] diabetes mellitus or weak immune system (e.g., HIV positive, cancer chemo, splenectomy, organ transplant, chronic steroids)   Negative: Patient sounds very sick or weak to the triager    Answer Assessment - Initial Assessment Questions  1. " "LOCATION: \"Where does it hurt?\"       Left side  2. ONSET: \"When did the headache start?\" (Minutes, hours or days)       Ongoing   3. PATTERN: \"Does the pain come and go, or has it been constant since it started?\"      Constant   4. SEVERITY: \"How bad is the pain?\" and \"What does it keep you from doing?\"  (e.g., Scale 1-10; mild, moderate, or severe)    - MILD (1-3): doesn't interfere with normal activities     - MODERATE (4-7): interferes with normal activities or awakens from sleep     - SEVERE (8-10): excruciating pain, unable to do any normal activities         8/10  5. RECURRENT SYMPTOM: \"Have you ever had headaches before?\" If Yes, ask: \"When was the last time?\" and \"What happened that time?\"       Yes   6. CAUSE: \"What do you think is causing the headache?\"      Migraines, aneurysm repair  7. MIGRAINE: \"Have you been diagnosed with migraine headaches?\" If Yes, ask: \"Is this headache similar?\"       Yes   8. HEAD INJURY: \"Has there been any recent injury to the head?\"       No   9. OTHER SYMPTOMS: \"Do you have any other symptoms?\" (fever, stiff neck, eye pain, sore throat, cold symptoms)      Nausea   10. PREGNANCY: \"Is there any chance you are pregnant?\" \"When was your last menstrual period?\"        N/A    Protocols used: Headache-ADULT-    "

## 2025-05-16 ENCOUNTER — TELEPHONE (OUTPATIENT)
Dept: NEUROSURGERY | Facility: CLINIC | Age: 45
End: 2025-05-16
Payer: COMMERCIAL

## 2025-05-16 NOTE — TELEPHONE ENCOUNTER
I spoke to patient about call from her  to the nurse in hospital. She states headaches are better, she believes she was dehydrated and now that she is back hydrated she feels better.     Patient aware to contact office if she has any other questions or concerns.

## 2025-05-18 ENCOUNTER — HOSPITAL ENCOUNTER (EMERGENCY)
Facility: HOSPITAL | Age: 45
Discharge: HOME | End: 2025-05-18
Attending: EMERGENCY MEDICINE | Admitting: EMERGENCY MEDICINE
Payer: COMMERCIAL

## 2025-05-18 VITALS
OXYGEN SATURATION: 99 % | HEART RATE: 115 BPM | DIASTOLIC BLOOD PRESSURE: 76 MMHG | RESPIRATION RATE: 16 BRPM | TEMPERATURE: 98.3 F | SYSTOLIC BLOOD PRESSURE: 112 MMHG

## 2025-05-18 DIAGNOSIS — R51.9 ACUTE NONINTRACTABLE HEADACHE, UNSPECIFIED HEADACHE TYPE: ICD-10-CM

## 2025-05-18 DIAGNOSIS — F41.9 ANXIETY: Primary | ICD-10-CM

## 2025-05-18 DIAGNOSIS — R00.2 PALPITATIONS: ICD-10-CM

## 2025-05-18 LAB
ALBUMIN SERPL-MCNC: 4.4 G/DL (ref 3.5–5.2)
ALBUMIN/GLOB SERPL: 1.8 G/DL
ALP SERPL-CCNC: 51 U/L (ref 39–117)
ALT SERPL W P-5'-P-CCNC: 14 U/L (ref 1–33)
ANION GAP SERPL CALCULATED.3IONS-SCNC: 14.3 MMOL/L (ref 5–15)
AST SERPL-CCNC: 16 U/L (ref 1–32)
BASOPHILS # BLD AUTO: 0.04 10*3/MM3 (ref 0–0.2)
BASOPHILS NFR BLD AUTO: 0.7 % (ref 0–1.5)
BILIRUB SERPL-MCNC: 0.6 MG/DL (ref 0–1.2)
BUN SERPL-MCNC: 11 MG/DL (ref 6–20)
BUN/CREAT SERPL: 14.1 (ref 7–25)
CALCIUM SPEC-SCNC: 9.9 MG/DL (ref 8.6–10.5)
CHLORIDE SERPL-SCNC: 100 MMOL/L (ref 98–107)
CO2 SERPL-SCNC: 21.7 MMOL/L (ref 22–29)
CREAT SERPL-MCNC: 0.78 MG/DL (ref 0.57–1)
DEPRECATED RDW RBC AUTO: 40.9 FL (ref 37–54)
EGFRCR SERPLBLD CKD-EPI 2021: 96.2 ML/MIN/1.73
EOSINOPHIL # BLD AUTO: 0.02 10*3/MM3 (ref 0–0.4)
EOSINOPHIL NFR BLD AUTO: 0.3 % (ref 0.3–6.2)
ERYTHROCYTE [DISTWIDTH] IN BLOOD BY AUTOMATED COUNT: 11.8 % (ref 12.3–15.4)
GLOBULIN UR ELPH-MCNC: 2.5 GM/DL
GLUCOSE SERPL-MCNC: 106 MG/DL (ref 65–99)
HCG SERPL QL: NEGATIVE
HCT VFR BLD AUTO: 36.6 % (ref 34–46.6)
HGB BLD-MCNC: 12 G/DL (ref 12–15.9)
IMM GRANULOCYTES # BLD AUTO: 0.01 10*3/MM3 (ref 0–0.05)
IMM GRANULOCYTES NFR BLD AUTO: 0.2 % (ref 0–0.5)
LYMPHOCYTES # BLD AUTO: 1.07 10*3/MM3 (ref 0.7–3.1)
LYMPHOCYTES NFR BLD AUTO: 17.7 % (ref 19.6–45.3)
MAGNESIUM SERPL-MCNC: 2.1 MG/DL (ref 1.6–2.6)
MCH RBC QN AUTO: 30.7 PG (ref 26.6–33)
MCHC RBC AUTO-ENTMCNC: 32.8 G/DL (ref 31.5–35.7)
MCV RBC AUTO: 93.6 FL (ref 79–97)
MONOCYTES # BLD AUTO: 0.43 10*3/MM3 (ref 0.1–0.9)
MONOCYTES NFR BLD AUTO: 7.1 % (ref 5–12)
NEUTROPHILS NFR BLD AUTO: 4.48 10*3/MM3 (ref 1.7–7)
NEUTROPHILS NFR BLD AUTO: 74 % (ref 42.7–76)
NRBC BLD AUTO-RTO: 0 /100 WBC (ref 0–0.2)
NT-PROBNP SERPL-MCNC: 39.9 PG/ML (ref 0–450)
PLATELET # BLD AUTO: 272 10*3/MM3 (ref 140–450)
PMV BLD AUTO: 10.7 FL (ref 6–12)
POTASSIUM SERPL-SCNC: 3.7 MMOL/L (ref 3.5–5.2)
PROT SERPL-MCNC: 6.9 G/DL (ref 6–8.5)
QT INTERVAL: 330 MS
QTC INTERVAL: 436 MS
RBC # BLD AUTO: 3.91 10*6/MM3 (ref 3.77–5.28)
SODIUM SERPL-SCNC: 136 MMOL/L (ref 136–145)
TROPONIN T SERPL HS-MCNC: <6 NG/L
TSH SERPL DL<=0.05 MIU/L-ACNC: 2.66 UIU/ML (ref 0.27–4.2)
WBC NRBC COR # BLD AUTO: 6.05 10*3/MM3 (ref 3.4–10.8)

## 2025-05-18 PROCEDURE — 93005 ELECTROCARDIOGRAM TRACING: CPT

## 2025-05-18 PROCEDURE — 25010000002 PROCHLORPERAZINE 10 MG/2ML SOLUTION: Performed by: PHYSICIAN ASSISTANT

## 2025-05-18 PROCEDURE — 83735 ASSAY OF MAGNESIUM: CPT | Performed by: EMERGENCY MEDICINE

## 2025-05-18 PROCEDURE — 93010 ELECTROCARDIOGRAM REPORT: CPT | Performed by: INTERNAL MEDICINE

## 2025-05-18 PROCEDURE — 80050 GENERAL HEALTH PANEL: CPT | Performed by: EMERGENCY MEDICINE

## 2025-05-18 PROCEDURE — 99284 EMERGENCY DEPT VISIT MOD MDM: CPT

## 2025-05-18 PROCEDURE — 84703 CHORIONIC GONADOTROPIN ASSAY: CPT | Performed by: EMERGENCY MEDICINE

## 2025-05-18 PROCEDURE — 84484 ASSAY OF TROPONIN QUANT: CPT | Performed by: EMERGENCY MEDICINE

## 2025-05-18 PROCEDURE — 96375 TX/PRO/DX INJ NEW DRUG ADDON: CPT

## 2025-05-18 PROCEDURE — 96374 THER/PROPH/DIAG INJ IV PUSH: CPT

## 2025-05-18 PROCEDURE — 25010000002 LORAZEPAM PER 2 MG: Performed by: EMERGENCY MEDICINE

## 2025-05-18 PROCEDURE — 83880 ASSAY OF NATRIURETIC PEPTIDE: CPT | Performed by: EMERGENCY MEDICINE

## 2025-05-18 PROCEDURE — 93005 ELECTROCARDIOGRAM TRACING: CPT | Performed by: EMERGENCY MEDICINE

## 2025-05-18 RX ORDER — PROCHLORPERAZINE EDISYLATE 5 MG/ML
10 INJECTION INTRAMUSCULAR; INTRAVENOUS ONCE
Status: COMPLETED | OUTPATIENT
Start: 2025-05-18 | End: 2025-05-18

## 2025-05-18 RX ORDER — ACETAMINOPHEN 500 MG
1000 TABLET ORAL ONCE
Status: COMPLETED | OUTPATIENT
Start: 2025-05-18 | End: 2025-05-18

## 2025-05-18 RX ORDER — LORAZEPAM 2 MG/ML
1 INJECTION INTRAMUSCULAR ONCE
Status: COMPLETED | OUTPATIENT
Start: 2025-05-18 | End: 2025-05-18

## 2025-05-18 RX ORDER — SODIUM CHLORIDE 0.9 % (FLUSH) 0.9 %
10 SYRINGE (ML) INJECTION AS NEEDED
Status: DISCONTINUED | OUTPATIENT
Start: 2025-05-18 | End: 2025-05-18 | Stop reason: HOSPADM

## 2025-05-18 RX ORDER — ACETAMINOPHEN 500 MG
1000 TABLET ORAL ONCE
Status: DISCONTINUED | OUTPATIENT
Start: 2025-05-18 | End: 2025-05-18

## 2025-05-18 RX ORDER — KETOROLAC TROMETHAMINE 30 MG/ML
30 INJECTION, SOLUTION INTRAMUSCULAR; INTRAVENOUS ONCE
Status: DISCONTINUED | OUTPATIENT
Start: 2025-05-18 | End: 2025-05-18

## 2025-05-18 RX ORDER — DIPHENHYDRAMINE HYDROCHLORIDE 50 MG/ML
25 INJECTION, SOLUTION INTRAMUSCULAR; INTRAVENOUS ONCE
Status: DISCONTINUED | OUTPATIENT
Start: 2025-05-18 | End: 2025-05-18

## 2025-05-18 RX ADMIN — LORAZEPAM 1 MG: 2 INJECTION INTRAMUSCULAR; INTRAVENOUS at 10:55

## 2025-05-18 RX ADMIN — PROCHLORPERAZINE EDISYLATE 10 MG: 5 INJECTION INTRAMUSCULAR; INTRAVENOUS at 12:02

## 2025-05-18 RX ADMIN — ACETAMINOPHEN 1000 MG: 500 TABLET, FILM COATED ORAL at 12:02

## 2025-05-18 NOTE — ED PROVIDER NOTES
EMERGENCY DEPARTMENT ENCOUNTER      Room Number:  03/03  PCP: Sarah Moreland APRN  Independent Historians: Patient  Patient Care Team:  Sarah Moreland APRN as PCP - General (Nurse Practitioner)       HPI:  Chief Complaint: Palpitations/Anxiety    A complete HPI/ROS/PMH/PSH/SH/FH are unobtainable due to: None    Chronic or social conditions impacting patient care (Social Determinants of Health): None      Context: Ekaterina Ortez is a 45 y.o. female with a PMH significant for internal carotid artery stenosis, anemia, endometriosis, migraines, cerebral aneurysm who presents to the ED c/o acute anxiety and palpitations.  The patient reports that she had a stressful conversation with her  yesterday that led to a panic attack.  She has had a handful of panic attacks in her life but does not deal with them regularly.  Even after she felt somewhat better she continued overnight with palpitations and fluttering in her chest and anxiety.  She woke up this morning with persistent symptoms so she came here for evaluation.  She does report that she is recovering at home from a cerebral aneurysm coil that was placed 2 weeks ago.  No concerns regarding her procedure and reports that she has been recovering well with no complications.  No chest pain, shortness of breath, nausea, vomiting today.      Upon review of prior external notes (non-ED) -and- Review of prior external test results outside of this encounter it appears the patient was evaluated in the office with OB/GYN for routine gynecological examination on 8/26/2024.  The patient had a normal potassium on 5/8/2025 and a normal iron profile on that date.      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    Internal carotid artery stenosis, left 05/08/2025    Tachycardia 05/08/2025    Anemia 05/08/2025      Resolved Ambulatory Problems     Diagnosis Date Noted    No Resolved Ambulatory Problems     Past Medical History:   Diagnosis Date    Endometriosis     Migraine          PAST SURGICAL HISTORY  Past Surgical History:   Procedure Laterality Date     SECTION      x 3    DIAGNOSTIC LAPAROSCOPY      x 2         FAMILY HISTORY  Family History   Problem Relation Age of Onset    Uterine cancer Neg Hx     Colon cancer Neg Hx     Ovarian cancer Neg Hx     Breast cancer Neg Hx          SOCIAL HISTORY  Social History     Socioeconomic History    Marital status:    Tobacco Use    Smoking status: Never    Smokeless tobacco: Never   Vaping Use    Vaping status: Never Used   Substance and Sexual Activity    Alcohol use: Never    Drug use: Never    Sexual activity: Yes     Partners: Male     Birth control/protection: Vasectomy         ALLERGIES  Penicillins and Keflex [cephalexin]      REVIEW OF SYSTEMS  Included in HPI  All systems reviewed and negative except for those discussed in HPI.      PHYSICAL EXAM    I have reviewed the triage vital signs and nursing notes.    ED Triage Vitals [25 1027]   Temp Heart Rate Resp BP SpO2   98.3 °F (36.8 °C) 103 16 -- 98 %      Temp src Heart Rate Source Patient Position BP Location FiO2 (%)   -- -- -- -- --       Physical Exam  Constitutional:       General: She is not in acute distress.     Appearance: She is well-developed. She is not ill-appearing or toxic-appearing.   HENT:      Head: Normocephalic and atraumatic.   Eyes:      General: No scleral icterus.     Conjunctiva/sclera: Conjunctivae normal.   Neck:      Trachea: No tracheal deviation.   Cardiovascular:      Rate and Rhythm: Regular rhythm. Tachycardia present.   Pulmonary:      Effort: Pulmonary effort is normal.      Breath sounds: Normal breath sounds.   Abdominal:      Palpations: Abdomen is soft.      Tenderness: There is no abdominal tenderness.   Musculoskeletal:         General: No deformity.       Cervical back: Normal range of motion.   Lymphadenopathy:      Cervical: No cervical adenopathy.   Skin:     General: Skin is warm and dry.   Neurological:      Mental Status: She is alert and oriented to person, place, and time.   Psychiatric:         Mood and Affect: Mood is anxious.         Behavior: Behavior normal.         Vital signs and nursing notes reviewed.      PPE: I wore a surgical mask throughout my encounters with the pt. I performed hand hygiene on entry into the pt room and upon exit.     LAB RESULTS  Recent Results (from the past 24 hours)   ECG 12 Lead Tachycardia    Collection Time: 05/18/25 10:30 AM   Result Value Ref Range    QT Interval 330 ms    QTC Interval 436 ms   Comprehensive Metabolic Panel    Collection Time: 05/18/25 10:54 AM    Specimen: Blood   Result Value Ref Range    Glucose 106 (H) 65 - 99 mg/dL    BUN 11 6 - 20 mg/dL    Creatinine 0.78 0.57 - 1.00 mg/dL    Sodium 136 136 - 145 mmol/L    Potassium 3.7 3.5 - 5.2 mmol/L    Chloride 100 98 - 107 mmol/L    CO2 21.7 (L) 22.0 - 29.0 mmol/L    Calcium 9.9 8.6 - 10.5 mg/dL    Total Protein 6.9 6.0 - 8.5 g/dL    Albumin 4.4 3.5 - 5.2 g/dL    ALT (SGPT) 14 1 - 33 U/L    AST (SGOT) 16 1 - 32 U/L    Alkaline Phosphatase 51 39 - 117 U/L    Total Bilirubin 0.6 0.0 - 1.2 mg/dL    Globulin 2.5 gm/dL    A/G Ratio 1.8 g/dL    BUN/Creatinine Ratio 14.1 7.0 - 25.0    Anion Gap 14.3 5.0 - 15.0 mmol/L    eGFR 96.2 >60.0 mL/min/1.73   BNP    Collection Time: 05/18/25 10:54 AM    Specimen: Blood   Result Value Ref Range    proBNP 39.9 0.0 - 450.0 pg/mL   High Sensitivity Troponin T    Collection Time: 05/18/25 10:54 AM    Specimen: Blood   Result Value Ref Range    HS Troponin T <6 <14 ng/L   Magnesium    Collection Time: 05/18/25 10:54 AM    Specimen: Blood   Result Value Ref Range    Magnesium 2.1 1.6 - 2.6 mg/dL   TSH Rfx On Abnormal To Free T4    Collection Time: 05/18/25 10:54 AM    Specimen: Blood   Result Value Ref Range    TSH 2.660 0.270 -  4.200 uIU/mL   CBC Auto Differential    Collection Time: 05/18/25 10:54 AM    Specimen: Blood   Result Value Ref Range    WBC 6.05 3.40 - 10.80 10*3/mm3    RBC 3.91 3.77 - 5.28 10*6/mm3    Hemoglobin 12.0 12.0 - 15.9 g/dL    Hematocrit 36.6 34.0 - 46.6 %    MCV 93.6 79.0 - 97.0 fL    MCH 30.7 26.6 - 33.0 pg    MCHC 32.8 31.5 - 35.7 g/dL    RDW 11.8 (L) 12.3 - 15.4 %    RDW-SD 40.9 37.0 - 54.0 fl    MPV 10.7 6.0 - 12.0 fL    Platelets 272 140 - 450 10*3/mm3    Neutrophil % 74.0 42.7 - 76.0 %    Lymphocyte % 17.7 (L) 19.6 - 45.3 %    Monocyte % 7.1 5.0 - 12.0 %    Eosinophil % 0.3 0.3 - 6.2 %    Basophil % 0.7 0.0 - 1.5 %    Immature Grans % 0.2 0.0 - 0.5 %    Neutrophils, Absolute 4.48 1.70 - 7.00 10*3/mm3    Lymphocytes, Absolute 1.07 0.70 - 3.10 10*3/mm3    Monocytes, Absolute 0.43 0.10 - 0.90 10*3/mm3    Eosinophils, Absolute 0.02 0.00 - 0.40 10*3/mm3    Basophils, Absolute 0.04 0.00 - 0.20 10*3/mm3    Immature Grans, Absolute 0.01 0.00 - 0.05 10*3/mm3    nRBC 0.0 0.0 - 0.2 /100 WBC   hCG, Serum, Qualitative    Collection Time: 05/18/25 10:54 AM    Specimen: Blood   Result Value Ref Range    HCG Qualitative Negative Negative         RADIOLOGY  No Radiology Exams Resulted Within Past 24 Hours      MEDICATIONS GIVEN IN ER  Medications   sodium chloride 0.9 % flush 10 mL (has no administration in time range)   LORazepam (ATIVAN) injection 1 mg (1 mg Intravenous Given 5/18/25 4165)   prochlorperazine (COMPAZINE) injection 10 mg (10 mg Intravenous Given 5/18/25 1202)   acetaminophen (TYLENOL) tablet 1,000 mg (1,000 mg Oral Given 5/18/25 1202)         ORDERS PLACED DURING THIS VISIT:  Orders Placed This Encounter   Procedures    Comprehensive Metabolic Panel    BNP    High Sensitivity Troponin T    Magnesium    TSH Rfx On Abnormal To Free T4    CBC Auto Differential    hCG, Serum, Qualitative    Continuous Pulse Oximetry    Monitor Blood Pressure    ECG 12 Lead Tachycardia    Insert Peripheral IV    CBC & Differential          OUTPATIENT MEDICATION MANAGEMENT:  Current Facility-Administered Medications Ordered in Epic   Medication Dose Route Frequency Provider Last Rate Last Admin    sodium chloride 0.9 % flush 10 mL  10 mL Intravenous PRN Alessio Limon MD         Current Outpatient Medications Ordered in Epic   Medication Sig Dispense Refill    aspirin 81 MG EC tablet Take 1 tablet by mouth Daily. 30 tablet 0    atorvastatin (LIPITOR) 40 MG tablet Take 1 tablet by mouth Every Night. 30 tablet 0    Vitamin B-2 (RIBOFLAVIN) 100 MG tablet tablet Take 1 tablet by mouth Daily. 30 each 0           PROGRESS, DATA ANALYSIS, CONSULTS, AND MEDICAL DECISION MAKING  All labs have been independently interpreted by me.  All radiology studies have been reviewed by me. All EKG's have been independently viewed and interpreted by me.  Discussion below represents my analysis of pertinent findings related to patient's condition, differential diagnosis, treatment plan and final disposition.    Patient presentation and evaluation most consistent with     DIFFERENTIAL DIAGNOSIS INCLUDE BUT NOT LIMITED TO:     Anxiety, panic attack, tension headache, cluster headache    Clinical Scores: N/A      ED Course as of 05/18/25 1235   Sun May 18, 2025   1039 I reviewed echo from 5/9/2025 that showed EF 61 to 65%, mild TR.  I reviewed discharge summary from 5/10/2025.  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 [JR]   1039 EKG          EKG time: 10:30 AM  Rhythm/Rate: Sinus tach, 105  P waves and NE: Normal  QRS, axis: Normal axis, LVH  ST and T waves: Borderline ST depression inferiorly    Interpreted Contemporaneously by me, independently viewed         [JR]   1134 TSH Baseline: 2.660 [DC]   1157 Patient started with a frontal headache just a couple of minutes after her Ativan administration.  Plan to treat with Tylenol and Compazine at this time.  No worrisome or concerning characteristics to her  headache at this time. [DC]      ED Course User Index  [DC] Moises Cox, PA  [JR] Alessio Limon MD       0583 I rechecked the patient.  She is much more comfortable appearing, resting in bed.  Reports good improvement of her symptoms of anxiety and headache.  No new or worrisome findings at this time.  I discussed the patient's labs, diagnosis, and plan for discharge.  A repeat exam reveals no new worrisome changes from my initial exam findings.  The patient understands that the fact that they are being discharged does not denote that nothing is abnormal, it indicates that no clinical emergency is present and that they must follow-up as directed in order to properly maintain their health.  Follow-up instructions (specifically listed below) and return to ER precautions were given at this time.  I specifically instructed the patient to follow-up with their PCP.  The patient understands and agrees with the plan, and is ready for discharge.  All questions answered.         AS OF 12:35 EDT VITALS:    BP - 110/75  HR - 88  TEMP - 98.3 °F (36.8 °C)  O2 SATS - 97%    COMPLEXITY OF CARE  Admission was considered but after careful review of the patient's presentation, physical examination, diagnostic results, and response to treatment the patient may be safely discharged with outpatient follow-up.      DIAGNOSIS  Final diagnoses:   Anxiety   Acute nonintractable headache, unspecified headache type   Palpitations         DISPOSITION  ED Disposition       ED Disposition   Discharge    Condition   Stable    Comment   --                Please note that portions of this document were completed with a voice recognition program.    Note Disclaimer: At [facility], we believe that sharing information builds trust and better relationships. You are receiving this note because you recently visited [facility]. It is possible you will see health information before a provider has talked with you about it. This kind of  information can be easy to misunderstand. To help you fully understand what it means for your health, we urge you to discuss this note with your provider.         Moises Cox PA  05/19/25 0857

## 2025-05-18 NOTE — ED PROVIDER NOTES
MD ATTESTATION NOTE    The [facility] and I have discussed this patient's history, physical exam, MDM, and treatment plan.  I have reviewed the documentation and personally had a face to face interaction with the patient. I affirm the documentation and agree with the treatment and plan.  The attached note describes my personal findings.      I provided a substantive portion of the medical decision making.        Brief HPI: 45-year-old female presents with complaint of palpitations and feeling that she cannot get her heart rate to slow down.  She was just recently admitted for evaluation and treatment of a cerebral aneurysm.  She denies acute headache, nausea, vomiting.  She denies history of anxiety but she feels that she may have developed some whitecoat hypertension since her recent experience.  She was recently started on atorvastatin and aspirin, no other new medicines.    PHYSICAL EXAM  ED Triage Vitals   Temp Heart Rate Resp BP SpO2   05/18/25 1027 05/18/25 1027 05/18/25 1027 05/18/25 1031 05/18/25 1027   98.3 °F (36.8 °C) 103 16 117/81 98 %      Temp src Heart Rate Source Patient Position BP Location FiO2 (%)   -- -- 05/18/25 1031 -- --     Sitting           GENERAL: Awake and alert, appears mildly anxious  HENT: nares patent  EYES: no scleral icterus, EOMI  CV: regular rhythm, normal rate  RESPIRATORY: normal effort  MUSCULOSKELETAL: no deformity  NEURO: alert, moves all extremities, follows commands, cranial nerves II through XII intact, speech fluent and clear  PSYCH:  calm, cooperative  SKIN: warm, dry    Vital signs and nursing notes reviewed.        Medical Decision Making:  ED Course as of 05/18/25 1317   Sun May 18, 2025   1039 I reviewed echo from 5/9/2025 that showed EF 61 to 65%, mild TR.  I reviewed discharge summary from 5/10/2025.  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 [JR]   1039 EKG          EKG time: 10:30 AM  Rhythm/Rate: Sinus tach, 105  P  waves and KS: Normal  QRS, axis: Normal axis, LVH  ST and T waves: Borderline ST depression inferiorly    Interpreted Contemporaneously by me, independently viewed         [JR]   1134 TSH Baseline: 2.660 [DC]   1157 Patient started with a frontal headache just a couple of minutes after her Ativan administration.  Plan to treat with Tylenol and Compazine at this time.  No worrisome or concerning characteristics to her headache at this time. [DC]      ED Course User Index  [DC] Moises Cox, PA  [JR] Alessio Limon MD Ritchie, Joseph David, MD  05/18/25 1759

## 2025-05-30 LAB
CV ZIO BASELINE AVG BPM: 83 BPM
CV ZIO BASELINE BPM HIGH: 188 BPM
CV ZIO BASELINE BPM LOW: 57 BPM
CV ZIO DEVICE ANALYSIS TIME: NORMAL
CV ZIO ECT SVE COUNT: 117 EPISODES
CV ZIO ECT SVE CPLT COUNT: 7 EPISODES
CV ZIO ECT SVE CPLT FREQ: NORMAL
CV ZIO ECT SVE FREQ: NORMAL
CV ZIO ECT SVE TPLT COUNT: 0 EPISODES
CV ZIO ECT SVE TPLT FREQ: 0
CV ZIO ECT VE COUNT: 134 EPISODES
CV ZIO ECT VE CPLT COUNT: 8 EPISODES
CV ZIO ECT VE CPLT FREQ: NORMAL
CV ZIO ECT VE FREQ: NORMAL
CV ZIO ECT VE TPLT COUNT: 0 EPISODES
CV ZIO ECT VE TPLT FREQ: 0
CV ZIO ECTOPIC SVE COUPLET RAW PERCENT: 0 %
CV ZIO ECTOPIC SVE ISOLATED PERCENT: 0.01 %
CV ZIO ECTOPIC SVE TRIPLET RAW PERCENT: 0 %
CV ZIO ECTOPIC VE COUPLET RAW PERCENT: 0 %
CV ZIO ECTOPIC VE ISOLATED PERCENT: 0.01 %
CV ZIO ECTOPIC VE TRIPLET RAW PERCENT: 0 %
CV ZIO ENROLLMENT END: NORMAL
CV ZIO ENROLLMENT START: NORMAL
CV ZIO PATIENT EVENTS DIARIES: 0
CV ZIO PATIENT EVENTS TRIGGERS: 0
CV ZIO PAUSE COUNT: 0
CV ZIO PRESCRIPTION STATUS: NORMAL
CV ZIO SVT AVG BPM: 181 BPM
CV ZIO SVT BPM HIGH: 188 BPM
CV ZIO SVT BPM LOW: 171 BPM
CV ZIO SVT COUNT: 1
CV ZIO SVT F EPI AVG BPM: 181 BPM
CV ZIO SVT F EPI BEATS: 5 BEATS
CV ZIO SVT F EPI BPM HIGH: 188 BPM
CV ZIO SVT F EPI BPM LOW: 171 BPM
CV ZIO SVT F EPI DUR: 1.5 SEC
CV ZIO SVT F EPI END: NORMAL
CV ZIO SVT F EPI START: NORMAL
CV ZIO SVT L EPI AVG BPM: 181 BPM
CV ZIO SVT L EPI BEATS: 5 BEATS
CV ZIO SVT L EPI BPM HIGH: 188 BPM
CV ZIO SVT L EPI BPM LOW: 171 BPM
CV ZIO SVT L EPI DUR: 1.5 SEC
CV ZIO SVT L EPI END: NORMAL
CV ZIO SVT L EPI START: NORMAL
CV ZIO TOTAL  ENROLLMENT PERIOD: NORMAL
CV ZIO VT COUNT: 0

## 2025-06-02 ENCOUNTER — TELEPHONE (OUTPATIENT)
Dept: NEUROSURGERY | Facility: CLINIC | Age: 45
End: 2025-06-02
Payer: COMMERCIAL

## 2025-06-02 NOTE — TELEPHONE ENCOUNTER
Spoke with pt. Pt informed me that she is having some strange random numbing on her right side, and recently it was in her hip. Please Advise.

## 2025-06-02 NOTE — TELEPHONE ENCOUNTER
Patient is s/p embolization of ophthalmic artery aneurysm 5/7/25.   She is concerns about numbness it starts at hand and tingling in face and hand she's had these symptoms about 4-5 times since being discharged from the hospital. Today, it started in her right hip while vacuuming out her car. All episodes lasts about 10 minutes each.     She denies any HA, dizziness, vision changes or nausea.     She has been advised to come to the ER if symptoms worsen or if she has another episode. Patient voiced understanding.

## 2025-06-06 ENCOUNTER — RESULTS FOLLOW-UP (OUTPATIENT)
Dept: NEUROLOGY | Facility: HOSPITAL | Age: 45
End: 2025-06-06
Payer: COMMERCIAL

## 2025-06-06 ENCOUNTER — TELEPHONE (OUTPATIENT)
Dept: NEUROSURGERY | Facility: CLINIC | Age: 45
End: 2025-06-06
Payer: COMMERCIAL

## 2025-06-06 DIAGNOSIS — R94.31 ABNORMAL HOLTER EXAM: Primary | ICD-10-CM

## 2025-06-06 NOTE — TELEPHONE ENCOUNTER
Patient requesting refill for Lipitor and ASA. [facility].       Patient is s/p embolization of a 6 mm ophthalmic artery 5/7/25.

## 2025-06-06 NOTE — TELEPHONE ENCOUNTER
"[place] pt- on day 12 of her period and wants to know if she should "freak out." Normally has a 5-7 day period. The bleeding is not heavy, changing 4-5 tampons a day. She is not on any form of birth control and does not wish to be at this time. Reassured pt that this could honestly be an odd period, and it does not necessarily mean something is wrong. Advised to try not to stress over it continue to monitor for now and if it continues and she is still bleeding in a week or 2 to return call. Verbalized understanding, and is aware I will return call if Dr. Johnston has any further advice   " Patient states she will get the ASA over the counter. She said she was told to continue the Lipitor to help the aneurysm from recanalizing by Dr. Billy?

## 2025-06-06 NOTE — TELEPHONE ENCOUNTER
Called patient to discuss.  Left message to call back.   - ? Etiology , Repeat level improving,  -  S/P leukocytosis, resolved , WBC today 9.47,  pt is afebrile  - Pt had increased Lactate level from ER which had improved   -Leukocytosis -Reactive likely- improved.   - Likely sec to re starting Metformin / hypotension   -  RVP- neg  - Blood cx- negative  - UCx negative   -NEG S/P IV Fluids - ? Etiology , Repeat level improving,  -  S/P leukocytosis, resolved , WBC today 8.63 pt is afebrile  - Pt had increased Lactate level from ER which had improved   -Leukocytosis -Reactive likely- improved.   - Likely sec to re starting Metformin / hypotension   -  RVP- neg  - Blood cx- negative  - UCx negative   -NEG S/P IV Fluids ? etiology  repeat level improved  -  S/P leukocytosis, resolved , WBC today 8.63 pt is afebrile  - Pt had increased Lactate level from ER which had improved   -Leukocytosis -Reactive likely- improved.   - Likely sec to re starting Metformin / hypotension   -  RVP- neg  - Blood cx- negative  - UCx negative   -NEG S/P IV Fluids -- Coagulopathy 2/2 Liver disease/ Cirrhosis  Abnormal LFT's , Bili & PT/PTT/INR - Dr Rios -Hem Follow up  - pt follows GI (Dr. Morales)   -cont ppi, sandra, rifaximin bid  - f/u CMP in AM

## 2025-06-06 NOTE — TELEPHONE ENCOUNTER
----- Message from Belinda Downing sent at 6/4/2025 10:18 AM EDT -----  Regarding: FW:  Will you please refer her to cardiology? Let her know that she had some fast heart rates that I want double-checked  ----- Message -----  From: Oniel Quispe MD  Sent: 5/30/2025  11:31 AM PDT  To: OBED Cárdenas

## 2025-06-06 NOTE — TELEPHONE ENCOUNTER
Any routine refill requests need to go through RX request from their pharmacy(by the patient calling their pharmacy and requesting the refill), unless they are calling to change dosage or change pharmacy or an urgent request.     Confirm pharmacy.[facility] [address]     Which prescription needs to be filled?Baby ASA and Lipitor     How many days' worth does have the patient currently have left?1

## 2025-06-09 ENCOUNTER — TELEPHONE (OUTPATIENT)
Dept: NEUROSURGERY | Facility: CLINIC | Age: 45
End: 2025-06-09
Payer: COMMERCIAL

## 2025-06-09 RX ORDER — ATORVASTATIN CALCIUM 40 MG/1
40 TABLET, FILM COATED ORAL NIGHTLY
Qty: 30 TABLET | Refills: 0 | Status: CANCELLED | OUTPATIENT
Start: 2025-06-09

## 2025-06-09 RX ORDER — ASPIRIN 81 MG/1
81 TABLET ORAL DAILY
Qty: 30 TABLET | Refills: 0 | Status: CANCELLED | OUTPATIENT
Start: 2025-06-09

## 2025-06-09 NOTE — TELEPHONE ENCOUNTER
Patient called back she stated she did speak with neurology and they will not refill her medication since she has not seen them yet, they told her they need to speak with us about or her PCP but as has never seen her PCP. However patient ran out of medication today and wants know if she doesn't take it ill it harm her. Patient would like an answer and call back today,

## 2025-06-09 NOTE — TELEPHONE ENCOUNTER
Caller: Ekaterina Ortez    Relationship: Self    Best call back number: [phone]     Requested Prescriptions:   Requested Prescriptions     Pending Prescriptions Disp Refills    aspirin 81 MG EC tablet 30 tablet 0     Sig: Take 1 tablet by mouth Daily.    atorvastatin (LIPITOR) 40 MG tablet 30 tablet 0     Sig: Take 1 tablet by mouth Every Night.        Pharmacy where request should be sent: [id number] [address] AT [facility] [phone] [facility] [phone]      Last office visit with prescribing clinician: Visit date not found   Last telemedicine visit with prescribing clinician: Visit date not found   Next office visit with prescribing clinician: Visit date not found     Additional details provided by patient:      Does the patient have less than a 3 day supply:  [x] Yes  [] No    Would you like a call back once the refill request has been completed: [x] Yes [] No    If the office needs to give you a call back, can they leave a voicemail: [x] Yes [] No    Dmearcus Pierce Rep   06/09/25 10:40 EDT

## 2025-06-09 NOTE — TELEPHONE ENCOUNTER
I spoke to patient let her know message has been forwarded to Neurology for clarification of the Lipitor. I asked her if she needed me to contact her  with the information she said, it was fine that I let her know.

## 2025-06-09 NOTE — TELEPHONE ENCOUNTER
PT  has concerns about wife medication Lipitor, that PT is out of. PT has questions& concerns about being out of it. Please refill script@ [facility] [address] [facility]. Please advise.      Thank you

## 2025-06-10 ENCOUNTER — HOSPITAL ENCOUNTER (OUTPATIENT)
Facility: HOSPITAL | Age: 45
Discharge: HOME | End: 2025-06-10
Admitting: NEUROLOGICAL SURGERY
Payer: COMMERCIAL

## 2025-06-10 DIAGNOSIS — I67.1 ANEURYSM OF OPHTHALMIC ARTERY: ICD-10-CM

## 2025-06-10 PROCEDURE — 25510000001 IOPAMIDOL PER 1 ML: Performed by: NEUROLOGICAL SURGERY

## 2025-06-10 PROCEDURE — 70496 CT ANGIOGRAPHY HEAD: CPT

## 2025-06-10 PROCEDURE — 70498 CT ANGIOGRAPHY NECK: CPT

## 2025-06-10 RX ORDER — IOPAMIDOL 755 MG/ML
100 INJECTION, SOLUTION INTRAVASCULAR
Status: COMPLETED | OUTPATIENT
Start: 2025-06-10 | End: 2025-06-10

## 2025-06-10 RX ORDER — ATORVASTATIN CALCIUM 40 MG/1
40 TABLET, FILM COATED ORAL NIGHTLY
Qty: 30 TABLET | Refills: 4 | Status: SHIPPED | OUTPATIENT
Start: 2025-06-10

## 2025-06-10 RX ORDER — ASPIRIN 81 MG/1
81 TABLET ORAL DAILY
Qty: 30 TABLET | Refills: 4 | Status: SHIPPED | OUTPATIENT
Start: 2025-06-10

## 2025-06-10 RX ADMIN — IOPAMIDOL 95 ML: 755 INJECTION, SOLUTION INTRAVENOUS at 15:41

## 2025-06-10 NOTE — TELEPHONE ENCOUNTER
I spoke to patient let her know the medication was refilled. She will contact Neurology for any additional questions.

## 2025-06-12 NOTE — PROGRESS NOTES
Subjective   History of Present Illness: Ekaterina Ortez is a 45 y.o. 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 CTA head/neck completed on 6/10/25.  She is doing well today.  No new complaints.  Denies any changes in the frequency or severity of her headaches.  Denies any changes in vision.  Denies any strokelike episodes.  Denies any changes in strength or sensation.      History of Present Illness    The following portions of the patient's history were reviewed and updated as appropriate: allergies, current medications, past family history, past medical history, past social history, past surgical history, and problem list.    Past Medical History:   Diagnosis Date    Endometriosis     Migraine         Past Surgical History:   Procedure Laterality Date     SECTION      x 3    DIAGNOSTIC LAPAROSCOPY      x 2          Current Outpatient Medications:     aspirin 81 MG EC tablet, Take 1 tablet by mouth Daily., Disp: 30 tablet, Rfl: 4    atorvastatin (LIPITOR) 40 MG tablet, Take 1 tablet by mouth Every Night., Disp: 30 tablet, Rfl: 4    Vitamin B-2 (RIBOFLAVIN) 100 MG tablet tablet, Take 1 tablet by mouth Daily., Disp: 30 each, Rfl: 0     Allergies   Allergen Reactions    Penicillins Anaphylaxis    Keflex [Cephalexin] Rash        Social History     Socioeconomic History    Marital status:    Tobacco Use    Smoking status: Never    Smokeless tobacco: Never   Vaping Use    Vaping status: Never Used   Substance and Sexual Activity    Alcohol use: Never    Drug use: Never    Sexual activity: Yes     Partners: Male     Birth control/protection: Vasectomy        Family History   Problem Relation Age of Onset    Uterine cancer Neg Hx     Colon cancer Neg Hx     Ovarian cancer Neg Hx     Breast cancer Neg Hx         Review of Systems   Constitutional:  Positive for fatigue.   Eyes:  Positive for visual disturbance.   Neurological:  Positive for numbness (face hand) and headaches.  Negative for dizziness and light-headedness.   All other systems reviewed and are negative.      Objective     Vitals:    25 1328   BP: 110/66   Pulse: 120   Temp: 97.2 °F (36.2 °C)   SpO2: 99%   Weight: 52.2 kg (115 lb)     Body mass index is 18.56 kg/m².      Physical Exam  Awake, alert, oriented x3  Pupils equal round reactive to light  Extraocular muscles intact  Face symmetric  Speech is fluent and clear  No pronator drift  Motor exam  Bilateral deltoids 5/5, bilateral biceps 5/5, bilateral triceps 5/5, bilateral wrist extension 5/5 bilateral hand  5/5  Bilateral hip flexion 5/5, bilateral knee extension 5/5, bilateral DF/PF 5/5  No clonus  No Khalida's reflex  Steady normal gait  Able to detect  light touch in all 4 extremities        Assessment & Plan   Independent Review of Radiographic Studies:      I personally reviewed the images from the following studies.  CTA head neck from Litzy 10, 2025  The CTA images were reviewed and demonstrate no evidence of residual or recurrent aneurysm.  There is metallic artifact from the web device.  The previously seen petrous ICA stenosis is difficult to visualize on the study    Medical Decision Makin-year-old female s/p successful embolization of a 6 mm ophthalmic artery aneurysm using a web device on May 7, 2025  - During the angiogram she was noted to have severe stenosis of the left petrous ICA.  Neurology was consulted while in the hospital to help medically manage and optimize the patient to reduce the risk of stroke for the severe stenosis.  She seems to be recovering well from the procedure and doing well today.  I will plan to have her follow-up in 2 months with a repeat cerebral angiogram to evaluate for any residual or recurrent aneurysm and to assess that the left petrous ICA stenosis.     Diagnoses and all orders for this visit:    1. Aneurysm of ophthalmic artery (Primary)      Return For a diagnostic cerebral angiogram.    I spent 40  minutes reviewing the medical record, reviewing the CTA images, discussing the management of intracranial carotid stenosis, discussing the management of intracranial aneurysms, discussing the risks and benefits of diagnostic cerebral angiogram

## 2025-06-18 ENCOUNTER — PREP FOR SURGERY (OUTPATIENT)
Dept: OTHER | Facility: HOSPITAL | Age: 45
End: 2025-06-18
Payer: COMMERCIAL

## 2025-06-18 ENCOUNTER — OFFICE VISIT (OUTPATIENT)
Dept: NEUROSURGERY | Facility: CLINIC | Age: 45
End: 2025-06-18
Payer: COMMERCIAL

## 2025-06-18 VITALS
DIASTOLIC BLOOD PRESSURE: 66 MMHG | SYSTOLIC BLOOD PRESSURE: 110 MMHG | BODY MASS INDEX: 18.56 KG/M2 | TEMPERATURE: 97.2 F | OXYGEN SATURATION: 99 % | HEART RATE: 120 BPM | WEIGHT: 115 LBS

## 2025-06-18 DIAGNOSIS — I67.1 ANEURYSM OF OPHTHALMIC ARTERY: Primary | ICD-10-CM

## 2025-06-18 DIAGNOSIS — I67.1 CEREBRAL ANEURYSM: ICD-10-CM

## 2025-06-26 ENCOUNTER — TELEPHONE (OUTPATIENT)
Dept: NEUROLOGY | Facility: CLINIC | Age: 45
End: 2025-06-26
Payer: COMMERCIAL

## 2025-06-26 ENCOUNTER — TELEPHONE (OUTPATIENT)
Dept: NEUROSURGERY | Facility: CLINIC | Age: 45
End: 2025-06-26
Payer: COMMERCIAL

## 2025-06-26 NOTE — TELEPHONE ENCOUNTER
Patient  would like to know which Neurosurgeon's out of state that you referred to during visit that specialize in meshing for aneurysm?  He is requesting a list so he can give to the miliary for areas of placement so his wife can be established with a surgeon if needed.

## 2025-06-26 NOTE — TELEPHONE ENCOUNTER
Patient's  called and would like to s/w  to find out if h knows of any Neurosurgeons that can treat his wife outside of the [facility], he is going into the  and needs to know so that they can be stationed near a Neurosurgeon that can treat his wife

## 2025-06-26 NOTE — TELEPHONE ENCOUNTER
MD [facility] questioned about insurance being in or out of network.  S/w pt spouse.  Advised to call (potential new insurance) to find out if what pt will be seen for will be covered by .

## 2025-06-30 NOTE — TELEPHONE ENCOUNTER
Patient's  called stating that the last conversation he with Dr Billy, Dr Billy told him that he could give him a few names of some neurosurgeons that specializes the type of care his wifes needs.  stated that he doesn't need a  list of names from around the country just the names that Dr Billy knows. Stated that he could do the research himself and to see if they're in network with his insurance.  also stated that he's sorry to be a bother but just wanted to touch base with Dr Billy about this . Please call  and advise

## 2025-07-01 ENCOUNTER — OFFICE VISIT (OUTPATIENT)
Dept: CARDIOLOGY | Age: 45
End: 2025-07-01
Payer: COMMERCIAL

## 2025-07-01 VITALS
DIASTOLIC BLOOD PRESSURE: 78 MMHG | BODY MASS INDEX: 18.35 KG/M2 | SYSTOLIC BLOOD PRESSURE: 110 MMHG | HEART RATE: 86 BPM | OXYGEN SATURATION: 98 % | HEIGHT: 66 IN | WEIGHT: 114.2 LBS

## 2025-07-01 DIAGNOSIS — R00.0 TACHYCARDIA: Primary | ICD-10-CM

## 2025-07-01 PROCEDURE — 99204 OFFICE O/P NEW MOD 45 MIN: CPT | Performed by: INTERNAL MEDICINE

## 2025-07-01 RX ORDER — SULFAMETHOXAZOLE AND TRIMETHOPRIM 400; 80 MG/1; MG/1
TABLET ORAL AS NEEDED
COMMUNITY
Start: 2025-06-11

## 2025-07-01 NOTE — PROGRESS NOTES
CARDIOLOGY    Lars Ojeda MD    ENCOUNTER DATE:  07/01/25      Ekaterina Ortez / 45 y.o. / female        CHIEF COMPLAINT / REASON FOR OFFICE VISIT     Rapid Heart Rate (SVT on Holter.)      HISTORY OF PRESENT ILLNESS       HPI    Ekaterina Ortez is a 45 y.o. female with ophthalmic artery aneurysm s/p embolization c/b small left frontal lobe punctate infarct, carotid stenosis, endometriosis who presents to [facility] as a new patient.    Patient was recently hospitalized for severe headache and found to have ophthalmic artery aneurysm. She underwent embolization of the aneurysm on 5/7/2025. The patient was also noted to have left-sided ICA stenosis.  Brain MRI revealed evidence of small punctuate infarct in the left frontal lobe and nonspecific encephalomalacia of the right operculum, felt to be related to her recent intervention per neurosurgery. The pt also had echo which showed normal LVEF and negative bubble study the patient is being discharged on aspirin and statin.  She also wore a 2-week Zio patch which was normal apart from 1 SVT lasting 5 beats without symptomatic correlation.    Today, patient has no acute complaints. Still occasional headache but improved. 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 She usually walks on the treadmill for up to an hour most days. Denies chest pain, dyspnea on exertion, palpitation, leg edema, orthopnea, PND, vision change, dizziness, syncope, bleeding, or unexpected falls. Occasional panic attacks. Does not recall having had a heart attack. Never smoker, denies alcohol or substance abuse.    REVIEW OF SYSTEMS     Review of Systems   Constitutional: Negative for weight loss.   Cardiovascular:  Negative for chest pain, dyspnea on exertion, leg swelling, orthopnea, palpitations, paroxysmal nocturnal dyspnea and syncope.   Respiratory:  Negative for shortness of breath.    Hematologic/Lymphatic: Negative for bleeding problem.   Musculoskeletal:   "Negative for falls.   Gastrointestinal:  Negative for hematochezia and melena.   Genitourinary:  Negative for hematuria.   Neurological:  Positive for headaches. Negative for dizziness and light-headedness.   Psychiatric/Behavioral:  Negative for altered mental status.            MEDICATIONS      Outpatient Encounter Medications as of 7/1/2025   Medication Sig Dispense Refill    aspirin 81 MG EC tablet Take 1 tablet by mouth Daily. 30 tablet 4    atorvastatin (LIPITOR) 40 MG tablet Take 1 tablet by mouth Every Night. 30 tablet 4    sulfamethoxazole-trimethoprim (BACTRIM,SEPTRA) 400-80 MG tablet As Needed.      Vitamin B-2 (RIBOFLAVIN) 100 MG tablet tablet Take 1 tablet by mouth Daily. 30 each 0     No facility-administered encounter medications on file as of 7/1/2025.         VITAL SIGNS     Visit Vitals  /78 (BP Location: Left arm, Patient Position: Sitting, Cuff Size: Adult)   Pulse 86   Ht 167.6 cm (65.98\")   Wt 51.8 kg (114 lb 3.2 oz)   SpO2 98%   BMI 18.44 kg/m²         Wt Readings from Last 3 Encounters:   07/01/25 51.8 kg (114 lb 3.2 oz)   06/18/25 52.2 kg (115 lb)   05/09/25 59 kg (130 lb)     Body mass index is 18.44 kg/m².      PHYSICAL EXAMINATION     Vitals and nursing note reviewed.   Constitutional:       Appearance: Healthy appearance. Not in distress.   Neck:      Vascular: No JVR.   Pulmonary:      Effort: Pulmonary effort is normal.      Breath sounds: Normal breath sounds. No wheezing. No rhonchi. No rales.   Cardiovascular:      Normal rate. Regular rhythm.      Murmurs: There is no murmur.   Edema:     Peripheral edema absent.   Abdominal:      General: There is no distension.      Palpations: Abdomen is soft.      Tenderness: There is no abdominal tenderness.   Musculoskeletal:      Cervical back: Neck supple. Skin:     General: Skin is cool.   Neurological:      Mental Status: Alert and oriented to person, place and time.           REVIEWED DATA     Procedures      Lab Results "   Component Value Date    WBC 6.05 05/18/2025    HGB 12.0 05/18/2025    HCT 36.6 05/18/2025    MCV 93.6 05/18/2025     05/18/2025       Lab Results   Component Value Date    HGBA1C 4.70 (L) 05/08/2025       Lab Results   Component Value Date    GLUCOSE 106 (H) 05/18/2025    BUN 11 05/18/2025    CREATININE 0.78 05/18/2025    EGFR 96.2 05/18/2025    BCR 14.1 05/18/2025     05/18/2025    K 3.7 05/18/2025    CO2 21.7 (L) 05/18/2025    CALCIUM 9.9 05/18/2025    ALBUMIN 4.4 05/18/2025    BILITOT 0.6 05/18/2025    AST 16 05/18/2025    ALT 14 05/18/2025       Lab Results   Component Value Date    CHOL 182 05/08/2025    TRIG 108 05/08/2025    HDL 54 05/08/2025     (H) 05/08/2025       Results for orders placed during the hospital encounter of 05/06/25    Adult Transthoracic Echo Complete W/ Cont if Necessary Per Protocol    Interpretation Summary    Left ventricular systolic function is normal. Left ventricular ejection fraction appears to be 61 - 65%.    Left ventricular diastolic function was normal.    Normal right ventricular cavity size and systolic function noted.    Saline test results are negative.    Mild tricuspid valve regurgitation is present.    Calculated right ventricular systolic pressure from tricuspid regurgitation is 28 mmHg.    There is no evidence of pericardial effusion        ASSESSMENT & PLAN     Diagnoses and all orders for this visit:    1. Tachycardia (Primary)       Brain aneurysm s/p embolization c/b small stroke: Recent hospitalization for the headache and patient was found to have ophthalmic artery aneurysm s/p embolization 5/7/2025. Brain MRI revealed evidence of small punctuate infarct in the left frontal lobe and nonspecific encephalomalacia of the right operculum, felt to be related to her recent intervention per neurosurgery. Echo was essentially normal, including LVEF 60-65%, no significant valvular abnormality, and negative bubble study. 14-day Zio patch showed 1 SVT  episode of only 5 seconds when she may have had a panic attack.  She is now feeling well, no further cardiac testing needed and will focus on continued risk factor modification.  Continue neurology and neurosurgery follow-up, with tentative repeat cerebral angiogram in 1 month with Dr. iBlly.  On ASA 81 daily. See below regarding lipid control.   Carotid stenosis: Aspirin and statin as above.  Hyperlipidemia: Lipids earlier this year 5/2025 showed HDL 54, , suboptimal.  On atorvastatin 40 daily following recent stroke, will continue without change, defer lipid monitoring to PCP and neurology (goal LDL < 70), advised on the need for pregnancy avoidance while on statin therapy.  SVT: Patient completed a 2-week Zio patch which was normal apart from 1 SVT lasting 5 beats without symptomatic correlation.  No further testing or medical therapy needed at this time.    I spent 27 minutes caring for Ekaterina on this date of service. This time includes time spent by me in the following activities: preparing for the visit, reviewing tests, performing a medically appropriate examination and/or evaluation, counseling and educating the patient/family/caregiver, and referring and communicating with other health care professionals.     Discussed yearly follow-up versus as needed, patient wishes to follow-up with PCP and call us back if new or worsening symptoms arise.    Lars Ojeda MD. PhD. [facility]  07/01/25  16:56 EDT    Part of this note may be an electronic transcription/translation of spoken language to printed text using the Dragon dictation system.

## 2025-07-02 ENCOUNTER — PATIENT ROUNDING (BHMG ONLY) (OUTPATIENT)
Dept: CARDIOLOGY | Age: 45
End: 2025-07-02
Payer: COMMERCIAL

## 2025-07-02 NOTE — PROGRESS NOTES
A My Chart message has been sent to the patient for PATIENT ROUNDING with [facility]

## 2025-07-03 ENCOUNTER — TELEPHONE (OUTPATIENT)
Dept: NEUROSURGERY | Facility: CLINIC | Age: 45
End: 2025-07-03
Payer: COMMERCIAL

## 2025-07-03 NOTE — TELEPHONE ENCOUNTER
Patient's  called and is asking for Neurosurgeons who specializes in patients care. He states DR. Billy stated 5% and he would like a list of those names.

## 2025-07-10 ENCOUNTER — TELEPHONE (OUTPATIENT)
Dept: NEUROSURGERY | Facility: CLINIC | Age: 45
End: 2025-07-10

## 2025-07-28 ENCOUNTER — TELEPHONE (OUTPATIENT)
Dept: NEUROSURGERY | Facility: CLINIC | Age: 45
End: 2025-07-28
Payer: COMMERCIAL

## 2025-07-28 NOTE — TELEPHONE ENCOUNTER
Called patients  back and advised that per another APC patient could take Tylenol for the fever. I also advised that if the fever persists he could take patient to ED to be evaluated.   Spouse was thankful for the call.

## 2025-07-28 NOTE — TELEPHONE ENCOUNTER
07/28/25   Spouse called again. Please see my note below.....        Veronica Hand, [facility]  Staff     Telephone Encounter     Signed     Encounter Date: 7/28/2025     Signed         07/28/25 Harjeet, patients spouse. Patient has possibly the measles. They entire family has had measles, except Harjeet. Patient is running a temp of 100, this morning and achy all over. Patient cannot take ibuprofen per Dr Billy, because of the aneurysm surgery. She is taking Asprin and Lipitor, since the surgery.The family has been quarantine because of the measles.  [facility] has been informed.  Harjeet is in the .  The spouse is asking if it's ok to take ibuprofen or something else.  He is asking should he take her to the hospital?  Patient has surgery a few months ago with Dr Billy.  Spouse informed patient has another procedure on 08/05/25 with Dr Billy, will this symptom and possible measles be an issue for this procedure?     Please advise Harjeet at: [phone]           [id number] - [place], [place] - [address] AT [facility] & [address] - [phone]  - [phone] FX   [address] [facility] [address]   Phone: [phone] Fax: [phone]   Hours: Not open 24 hours

## 2025-07-28 NOTE — TELEPHONE ENCOUNTER
07/28/25 Harjeet, patients spouse. Patient has possibly the measles. They entire family has had measles, except Harjeet. Patient is running a temp of 100, this morning and achy all over. Patient cannot take ibuprofen per Dr Billy, because of the aneurysm surgery. She is taking Asprin and Lipitor, since the surgery.The family has been quarantine because of the measles.  [facility] has been informed.  Harjeet is in the .  The spouse is asking if it's ok to take ibuprofen or something else.  He is asking should he take her to the hospital?  Patient has surgery a few months ago with Dr Billy.  Spouse informed patient has another procedure on 08/05/25 with Dr Billy, will this symptom and possible measles be an issue for this procedure?    Please advise Harjeet at: [phone]        [id number] - [place], [place] - [address] AT [facility] & [address] - [phone]  - [phone] FX   [address] [facility] [address]   Phone: [phone] Fax: [phone]   Hours: Not open 24 hours

## 2025-07-29 ENCOUNTER — HOSPITAL ENCOUNTER (EMERGENCY)
Facility: HOSPITAL | Age: 45
Discharge: HOME | End: 2025-07-29
Attending: EMERGENCY MEDICINE | Admitting: EMERGENCY MEDICINE
Payer: COMMERCIAL

## 2025-07-29 VITALS
DIASTOLIC BLOOD PRESSURE: 73 MMHG | SYSTOLIC BLOOD PRESSURE: 105 MMHG | BODY MASS INDEX: 19.13 KG/M2 | WEIGHT: 119 LBS | HEIGHT: 66 IN | OXYGEN SATURATION: 100 % | HEART RATE: 91 BPM | TEMPERATURE: 98.8 F | RESPIRATION RATE: 18 BRPM

## 2025-07-29 DIAGNOSIS — Z20.828 EXPOSURE TO MEASLES VIRUS: ICD-10-CM

## 2025-07-29 DIAGNOSIS — M79.10 MYALGIA: ICD-10-CM

## 2025-07-29 DIAGNOSIS — R50.9 FEVER, UNSPECIFIED FEVER CAUSE: Primary | ICD-10-CM

## 2025-07-29 LAB
ALBUMIN SERPL-MCNC: 3.9 G/DL (ref 3.5–5.2)
ALBUMIN/GLOB SERPL: 1.4 G/DL
ALP SERPL-CCNC: 50 U/L (ref 39–117)
ALT SERPL W P-5'-P-CCNC: 17 U/L (ref 1–33)
ANION GAP SERPL CALCULATED.3IONS-SCNC: 10.5 MMOL/L (ref 5–15)
AST SERPL-CCNC: 17 U/L (ref 1–32)
BACTERIA UR QL AUTO: ABNORMAL /HPF
BASOPHILS # BLD AUTO: 0.04 10*3/MM3 (ref 0–0.2)
BASOPHILS NFR BLD AUTO: 0.7 % (ref 0–1.5)
BILIRUB SERPL-MCNC: 0.4 MG/DL (ref 0–1.2)
BILIRUB UR QL STRIP: NEGATIVE
BUN SERPL-MCNC: 7 MG/DL (ref 6–20)
BUN/CREAT SERPL: 7.1 (ref 7–25)
CALCIUM SPEC-SCNC: 8.8 MG/DL (ref 8.6–10.5)
CHLORIDE SERPL-SCNC: 102 MMOL/L (ref 98–107)
CK SERPL-CCNC: 39 U/L (ref 20–180)
CLARITY UR: CLEAR
CO2 SERPL-SCNC: 21.5 MMOL/L (ref 22–29)
COLOR UR: ABNORMAL
CREAT SERPL-MCNC: 0.98 MG/DL (ref 0.57–1)
DEPRECATED RDW RBC AUTO: 37.5 FL (ref 37–54)
EGFRCR SERPLBLD CKD-EPI 2021: 73.1 ML/MIN/1.73
EOSINOPHIL # BLD AUTO: 0 10*3/MM3 (ref 0–0.4)
EOSINOPHIL NFR BLD AUTO: 0 % (ref 0.3–6.2)
ERYTHROCYTE [DISTWIDTH] IN BLOOD BY AUTOMATED COUNT: 11 % (ref 12.3–15.4)
GLOBULIN UR ELPH-MCNC: 2.8 GM/DL
GLUCOSE SERPL-MCNC: 128 MG/DL (ref 65–99)
GLUCOSE UR STRIP-MCNC: NEGATIVE MG/DL
HCG SERPL QL: NEGATIVE
HCT VFR BLD AUTO: 33.2 % (ref 34–46.6)
HGB BLD-MCNC: 11.1 G/DL (ref 12–15.9)
HGB UR QL STRIP.AUTO: ABNORMAL
HOLD SPECIMEN: NORMAL
HOLD SPECIMEN: NORMAL
HYALINE CASTS UR QL AUTO: ABNORMAL /LPF
IMM GRANULOCYTES # BLD AUTO: 0.01 10*3/MM3 (ref 0–0.05)
IMM GRANULOCYTES NFR BLD AUTO: 0.2 % (ref 0–0.5)
KETONES UR QL STRIP: ABNORMAL
LEUKOCYTE ESTERASE UR QL STRIP.AUTO: NEGATIVE
LYMPHOCYTES # BLD AUTO: 0.48 10*3/MM3 (ref 0.7–3.1)
LYMPHOCYTES NFR BLD AUTO: 8.5 % (ref 19.6–45.3)
MAGNESIUM SERPL-MCNC: 2.1 MG/DL (ref 1.6–2.6)
MCH RBC QN AUTO: 30.6 PG (ref 26.6–33)
MCHC RBC AUTO-ENTMCNC: 33.4 G/DL (ref 31.5–35.7)
MCV RBC AUTO: 91.5 FL (ref 79–97)
MONOCYTES # BLD AUTO: 0.48 10*3/MM3 (ref 0.1–0.9)
MONOCYTES NFR BLD AUTO: 8.5 % (ref 5–12)
NEUTROPHILS NFR BLD AUTO: 4.61 10*3/MM3 (ref 1.7–7)
NEUTROPHILS NFR BLD AUTO: 82.1 % (ref 42.7–76)
NITRITE UR QL STRIP: NEGATIVE
NRBC BLD AUTO-RTO: 0 /100 WBC (ref 0–0.2)
PH UR STRIP.AUTO: 6 [PH] (ref 5–8)
PLATELET # BLD AUTO: 204 10*3/MM3 (ref 140–450)
PMV BLD AUTO: 10.6 FL (ref 6–12)
POTASSIUM SERPL-SCNC: 3.6 MMOL/L (ref 3.5–5.2)
PROT SERPL-MCNC: 6.7 G/DL (ref 6–8.5)
PROT UR QL STRIP: NEGATIVE
RBC # BLD AUTO: 3.63 10*6/MM3 (ref 3.77–5.28)
RBC # UR STRIP: ABNORMAL /HPF
REF LAB TEST METHOD: ABNORMAL
SODIUM SERPL-SCNC: 134 MMOL/L (ref 136–145)
SP GR UR STRIP: 1.01 (ref 1–1.03)
SQUAMOUS #/AREA URNS HPF: ABNORMAL /HPF
UROBILINOGEN UR QL STRIP: ABNORMAL
WBC # UR STRIP: ABNORMAL /HPF
WBC NRBC COR # BLD AUTO: 5.62 10*3/MM3 (ref 3.4–10.8)
WHOLE BLOOD HOLD COAG: NORMAL
WHOLE BLOOD HOLD SPECIMEN: NORMAL

## 2025-07-29 PROCEDURE — 81001 URINALYSIS AUTO W/SCOPE: CPT | Performed by: EMERGENCY MEDICINE

## 2025-07-29 PROCEDURE — 83735 ASSAY OF MAGNESIUM: CPT | Performed by: EMERGENCY MEDICINE

## 2025-07-29 PROCEDURE — 85025 COMPLETE CBC W/AUTO DIFF WBC: CPT | Performed by: EMERGENCY MEDICINE

## 2025-07-29 PROCEDURE — 99283 EMERGENCY DEPT VISIT LOW MDM: CPT

## 2025-07-29 PROCEDURE — 82550 ASSAY OF CK (CPK): CPT | Performed by: EMERGENCY MEDICINE

## 2025-07-29 PROCEDURE — 80053 COMPREHEN METABOLIC PANEL: CPT | Performed by: EMERGENCY MEDICINE

## 2025-07-29 PROCEDURE — 84703 CHORIONIC GONADOTROPIN ASSAY: CPT | Performed by: EMERGENCY MEDICINE

## 2025-07-29 RX ORDER — SODIUM CHLORIDE 0.9 % (FLUSH) 0.9 %
10 SYRINGE (ML) INJECTION AS NEEDED
Status: DISCONTINUED | OUTPATIENT
Start: 2025-07-29 | End: 2025-07-29 | Stop reason: HOSPADM

## 2025-07-29 NOTE — DISCHARGE INSTRUCTIONS
Due to your measles exposure, measles testing was sent to the state and you should be notified in approximately 48 hours.

## 2025-07-29 NOTE — ED PROVIDER NOTES
EMERGENCY DEPARTMENT ENCOUNTER  Room Number:  18/18  PCP: Provider, No Known  Independent Historians: Patient,       HPI:  Chief Complaint: had concerns including Muscle Pain.     A complete HPI/ROS/PMH/PSH/SH/FH are unobtainable due to: Nothing      Context: Ekaterina Ortez is a 45 y.o. female with a medical history of cerebral aneurysm who presents to the ED c/o acute bilateral knee pain and myalgias in both eyes.  She also reports having low-grade fevers for the last several days but this morning it was almost up to 102.  She also has 2 children at home that currently have measles.  One of them had been vaccinated, but possibly contracted it from a mission trip to an [facility] in [place].  She has also been vaccinated for measles as a child.  She denies cough, congestion, sore throat, eye redness, rash.  She has had some mild pain in her back tooth on the left side.  She denies dysuria or hematuria but has had dark urine.  She is also concerned that her symptoms could be related to rhabdomyolysis after she had started taking a statin for her cerebral aneurysm in May.  She is concerned that she could have contracted measles from her children, and is requesting testing because she is supposed to have a diagnostic angiogram performed next week.      Review of prior external notes (non-ED) -and- Review of prior external test results outside of this encounter: I reviewed discharge summary from May 10, 2025.  Patient was evaluated for headache.  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  She was also noted to have left sided ICA stenosis.        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    Internal carotid artery stenosis, left 05/08/2025    Tachycardia 05/08/2025    Anemia 05/08/2025    Cerebral aneurysm 06/18/2025     Resolved  Ambulatory Problems     Diagnosis Date Noted    No Resolved Ambulatory Problems     Past Medical History:   Diagnosis Date    Endometriosis     Migraine          PAST SURGICAL HISTORY  Past Surgical History:   Procedure Laterality Date     SECTION      x 3    DIAGNOSTIC LAPAROSCOPY      x 2         FAMILY HISTORY  Family History   Problem Relation Age of Onset    Hypertension Mother     Hyperlipidemia Mother     Uterine cancer Neg Hx     Colon cancer Neg Hx     Ovarian cancer Neg Hx     Breast cancer Neg Hx          SOCIAL HISTORY  Social History     Socioeconomic History    Marital status:    Tobacco Use    Smoking status: Never    Smokeless tobacco: Never   Vaping Use    Vaping status: Never Used   Substance and Sexual Activity    Alcohol use: Never    Drug use: Never    Sexual activity: Yes     Partners: Male     Birth control/protection: Vasectomy         ALLERGIES  Penicillins and Keflex [cephalexin]      REVIEW OF SYSTEMS  Review of all 14 systems is negative other than stated in the HPI above.      PHYSICAL EXAM    I have reviewed the triage vital signs and nursing notes.    ED Triage Vitals [25 0856]   Temp Heart Rate Resp BP SpO2   98.8 °F (37.1 °C) 96 14 116/75 98 %      Temp src Heart Rate Source Patient Position BP Location FiO2 (%)   Oral Monitor Sitting Left arm --         GENERAL: awake and alert, well-appearing, no acute distress  HENT: Normocephalic, atraumatic  EYES: no scleral icterus, EOMI, conjunctiva clear bilaterally  CV: regular rhythm, regular rate  RESPIRATORY: normal effort  ABDOMEN: soft, nondistended, nontender throughout  MUSCULOSKELETAL: no deformity, no significant point tenderness over the anterior thighs or knees bilaterally  NEURO: alert, moves all extremities, follows commands  PSYCH: calm, cooperative  SKIN: Warm, dry, normal to inspection, no rash          LAB RESULTS  Recent Results (from the past 24 hours)   Green Top (Gel)    Collection Time: 25   9:07 AM   Result Value Ref Range    Extra Tube Hold for add-ons.    Lavender Top    Collection Time: 07/29/25  9:07 AM   Result Value Ref Range    Extra Tube hold for add-on    Gold Top - SST    Collection Time: 07/29/25  9:07 AM   Result Value Ref Range    Extra Tube Hold for add-ons.    Light Blue Top    Collection Time: 07/29/25  9:07 AM   Result Value Ref Range    Extra Tube Hold for add-ons.    Comprehensive Metabolic Panel    Collection Time: 07/29/25  9:07 AM    Specimen: Blood   Result Value Ref Range    Glucose 128 (H) 65 - 99 mg/dL    BUN 7.0 6.0 - 20.0 mg/dL    Creatinine 0.98 0.57 - 1.00 mg/dL    Sodium 134 (L) 136 - 145 mmol/L    Potassium 3.6 3.5 - 5.2 mmol/L    Chloride 102 98 - 107 mmol/L    CO2 21.5 (L) 22.0 - 29.0 mmol/L    Calcium 8.8 8.6 - 10.5 mg/dL    Total Protein 6.7 6.0 - 8.5 g/dL    Albumin 3.9 3.5 - 5.2 g/dL    ALT (SGPT) 17 1 - 33 U/L    AST (SGOT) 17 1 - 32 U/L    Alkaline Phosphatase 50 39 - 117 U/L    Total Bilirubin 0.4 0.0 - 1.2 mg/dL    Globulin 2.8 gm/dL    A/G Ratio 1.4 g/dL    BUN/Creatinine Ratio 7.1 7.0 - 25.0    Anion Gap 10.5 5.0 - 15.0 mmol/L    eGFR 73.1 >60.0 mL/min/1.73   hCG, Serum, Qualitative    Collection Time: 07/29/25  9:07 AM    Specimen: Blood   Result Value Ref Range    HCG Qualitative Negative Negative   CK    Collection Time: 07/29/25  9:07 AM    Specimen: Blood   Result Value Ref Range    Creatine Kinase 39 20 - 180 U/L   Magnesium    Collection Time: 07/29/25  9:07 AM    Specimen: Blood   Result Value Ref Range    Magnesium 2.1 1.6 - 2.6 mg/dL   CBC Auto Differential    Collection Time: 07/29/25  9:07 AM    Specimen: Blood   Result Value Ref Range    WBC 5.62 3.40 - 10.80 10*3/mm3    RBC 3.63 (L) 3.77 - 5.28 10*6/mm3    Hemoglobin 11.1 (L) 12.0 - 15.9 g/dL    Hematocrit 33.2 (L) 34.0 - 46.6 %    MCV 91.5 79.0 - 97.0 fL    MCH 30.6 26.6 - 33.0 pg    MCHC 33.4 31.5 - 35.7 g/dL    RDW 11.0 (L) 12.3 - 15.4 %    RDW-SD 37.5 37.0 - 54.0 fl    MPV 10.6 6.0 - 12.0 fL     Platelets 204 140 - 450 10*3/mm3    Neutrophil % 82.1 (H) 42.7 - 76.0 %    Lymphocyte % 8.5 (L) 19.6 - 45.3 %    Monocyte % 8.5 5.0 - 12.0 %    Eosinophil % 0.0 (L) 0.3 - 6.2 %    Basophil % 0.7 0.0 - 1.5 %    Immature Grans % 0.2 0.0 - 0.5 %    Neutrophils, Absolute 4.61 1.70 - 7.00 10*3/mm3    Lymphocytes, Absolute 0.48 (L) 0.70 - 3.10 10*3/mm3    Monocytes, Absolute 0.48 0.10 - 0.90 10*3/mm3    Eosinophils, Absolute 0.00 0.00 - 0.40 10*3/mm3    Basophils, Absolute 0.04 0.00 - 0.20 10*3/mm3    Immature Grans, Absolute 0.01 0.00 - 0.05 10*3/mm3    nRBC 0.0 0.0 - 0.2 /100 WBC   Urinalysis With Microscopic If Indicated (No Culture) - Urine, Clean Catch    Collection Time: 07/29/25 10:21 AM    Specimen: Urine, Clean Catch   Result Value Ref Range    Color, UA Dark Yellow (A) Yellow, Straw    Appearance, UA Clear Clear    pH, UA 6.0 5.0 - 8.0    Specific Gravity, UA 1.013 1.005 - 1.030    Glucose, UA Negative Negative    Ketones, UA 40 mg/dL (2+) (A) Negative    Bilirubin, UA Negative Negative    Blood, UA Small (1+) (A) Negative    Protein, UA Negative Negative    Leuk Esterase, UA Negative Negative    Nitrite, UA Negative Negative    Urobilinogen, UA 0.2 E.U./dL 0.2 - 1.0 E.U./dL   Urinalysis, Microscopic Only - Urine, Clean Catch    Collection Time: 07/29/25 10:21 AM    Specimen: Urine, Clean Catch   Result Value Ref Range    RBC, UA 6-10 (A) None Seen, 0-2 /HPF    WBC, UA 0-2 None Seen, 0-2 /HPF    Bacteria, UA Trace (A) None Seen /HPF    Squamous Epithelial Cells, UA 0-2 None Seen, 0-2 /HPF    Hyaline Casts, UA None Seen None Seen /LPF    Methodology Automated Microscopy        The above labs were ordered by me and independently reviewed by me.     RADIOLOGY  No Radiology Exams Resulted Within Past 24 Hours    The above radiology studies were ordered by me.  See ED course for independent interpretations.     MEDICATIONS GIVEN IN ER  Medications   sodium chloride 0.9 % flush 10 mL (has no administration in time  range)   sodium chloride 0.9 % flush 10 mL (has no administration in time range)         ORDERS PLACED DURING THIS VISIT:  Orders Placed This Encounter   Procedures    Measles ([place]), PCR, Swab - Swab, Nasopharynx    [place] Draw    Comprehensive Metabolic Panel    hCG, Serum, Qualitative    CK    Magnesium    CBC Auto Differential    Urinalysis With Microscopic If Indicated (No Culture) - Urine, Clean Catch    Urinalysis, Microscopic Only - Urine, Clean Catch    Insert peripheral IV    Insert Peripheral IV    Green Top (Gel)    Lavender Top    Gold Top - SST    Light Blue Top    CBC & Differential         OUTPATIENT MEDICATION MANAGEMENT:  Current Facility-Administered Medications Ordered in Epic   Medication Dose Route Frequency Provider Last Rate Last Admin    sodium chloride 0.9 % flush 10 mL  10 mL Intravenous PRN Alessio Limon MD        sodium chloride 0.9 % flush 10 mL  10 mL Intravenous PRN Alessio Limon MD         Current Outpatient Medications Ordered in Epic   Medication Sig Dispense Refill    aspirin 81 MG EC tablet Take 1 tablet by mouth Daily. 30 tablet 4    atorvastatin (LIPITOR) 40 MG tablet Take 1 tablet by mouth Every Night. 30 tablet 4    sulfamethoxazole-trimethoprim (BACTRIM,SEPTRA) 400-80 MG tablet As Needed.      Vitamin B-2 (RIBOFLAVIN) 100 MG tablet tablet Take 1 tablet by mouth Daily. 30 each 0         PROCEDURES  Procedures            PROGRESS, DATA ANALYSIS, CONSULTS, AND MEDICAL DECISION MAKING  All labs have been independently interpreted by me.  All radiology studies have been reviewed by me. All EKG's have been independently viewed and interpreted by me.  Discussion below represents my analysis of pertinent findings related to patient's condition, differential diagnosis, treatment plan and final disposition.    Differential diagnosis includes but is not limited to:  Viral syndrome  Rhabdomyolysis  Statin induced myopathy  Pyelonephritis  Cystitis      Clinical  Scores:                  ED Course as of 07/29/25 1128   Tue Jul 29, 2025   1102 Creatine Kinase: 39 [JR]   1102 RBC, UA(!): 6-10 [JR]   1102 WBC, UA: 0-2 [JR]   1126 Patient reassessed.  I informed her of her normal CK level and otherwise reassuring labs.  I explained that her measles testing was sent to state and is pending at time of discharge.  I explained that she could also have myalgias secondary to her statin however she does not have evidence of rhabdomyolysis and to consult with her doctor regarding if she would like to try an alternative statin therapy.  Return precautions were discussed. [JR]      ED Course User Index  [JR] Alessio Limon MD       I wore an N95 mask throughout this patient encounter.      AS OF 11:28 EDT VITALS:    BP - 105/73  HR - 91  TEMP - 98.8 °F (37.1 °C) (Oral)  O2 SATS - 100%    COMPLEXITY OF CARE        Chronic or social conditions impacting patient care (Social Determinants of Health):     DIAGNOSIS  Final diagnoses:   Fever, unspecified fever cause   Myalgia   Exposure to measles virus           DISPOSITION  DISCHARGE    Patient discharged in stable condition.    Reviewed implications of results, diagnosis, meds, responsibility to follow up, warning signs and symptoms of possible worsening, potential complications and reasons to return to ER.    Patient/Family voiced understanding of above instructions.    Discussed plan for discharge, as there is no emergent indication for admission. Patient referred to primary care provider for BP management due to today's BP. Pt/family is agreeable and understands need for follow up and repeat testing.  Pt is aware that discharge does not mean that nothing is wrong but it indicates no emergency is present that requires admission and they must continue care with follow-up as given below or physician of their choice.     FOLLOW-UP  PATIENT CONNECTION - [facility] [id number]  [phone]  Call in 1 day            Medication List      No changes were made to your prescriptions during this visit.             Prescription drug monitoring program review:           Please note that portions of this document were completed with a voice recognition program.    Note Disclaimer: At [facility], we believe that sharing information builds trust and better relationships. You are receiving this note because you recently visited [facility]. It is possible you will see health information before a provider has talked with you about it. This kind of information can be easy to misunderstand. To help you fully understand what it means for your health, we urge you to discuss this note with your provider.         Alessio Limon MD  07/29/25 1128

## 2025-08-01 ENCOUNTER — TELEPHONE (OUTPATIENT)
Dept: NEUROSURGERY | Facility: CLINIC | Age: 45
End: 2025-08-01
Payer: COMMERCIAL

## 2025-08-01 NOTE — TELEPHONE ENCOUNTER
Patient is scheduled for a cerebral angiogram next Tuesday August 5th. She called to let us know that her sons have just finished their quarantine for the measles as they both recently contracted it. She felt sick last week but tested negative for the measles. She is calling to ask if it is ok to proceed with angiogram as she is feeling better however I do not know what the protocol is for the measles as far as how contagious it could be through other people. Please advise patient. Her number is [phone].

## 2025-08-04 LAB — REF LAB TEST RESULTS: NORMAL

## 2025-08-05 ENCOUNTER — ANESTHESIA EVENT (OUTPATIENT)
Dept: PERIOP | Facility: HOSPITAL | Age: 45
End: 2025-08-05
Payer: COMMERCIAL

## 2025-08-05 ENCOUNTER — APPOINTMENT (OUTPATIENT)
Dept: GENERAL RADIOLOGY | Facility: HOSPITAL | Age: 45
End: 2025-08-05
Payer: COMMERCIAL

## 2025-08-05 ENCOUNTER — ANESTHESIA (OUTPATIENT)
Dept: PERIOP | Facility: HOSPITAL | Age: 45
End: 2025-08-05
Payer: COMMERCIAL

## 2025-08-05 ENCOUNTER — HOSPITAL ENCOUNTER (OUTPATIENT)
Facility: HOSPITAL | Age: 45
Setting detail: HOSPITAL OUTPATIENT SURGERY
Discharge: HOME | End: 2025-08-05
Attending: NEUROLOGICAL SURGERY | Admitting: NEUROLOGICAL SURGERY
Payer: COMMERCIAL

## 2025-08-05 VITALS
SYSTOLIC BLOOD PRESSURE: 105 MMHG | TEMPERATURE: 98.9 F | HEART RATE: 83 BPM | HEIGHT: 66 IN | RESPIRATION RATE: 16 BRPM | DIASTOLIC BLOOD PRESSURE: 69 MMHG | BODY MASS INDEX: 19.29 KG/M2 | OXYGEN SATURATION: 99 % | WEIGHT: 120 LBS

## 2025-08-05 LAB
B-HCG UR QL: NEGATIVE
EXPIRATION DATE: NORMAL
INTERNAL NEGATIVE CONTROL: NORMAL
INTERNAL POSITIVE CONTROL: NORMAL
Lab: NORMAL

## 2025-08-05 PROCEDURE — C1769 GUIDE WIRE: HCPCS | Performed by: NEUROLOGICAL SURGERY

## 2025-08-05 PROCEDURE — 36224 PLACE CATH CAROTD ART: CPT | Performed by: NEUROLOGICAL SURGERY

## 2025-08-05 PROCEDURE — 25010000002 LIDOCAINE 1 % SOLUTION: Performed by: NEUROLOGICAL SURGERY

## 2025-08-05 PROCEDURE — 25010000002 HEPARIN (PORCINE) PER 1000 UNITS: Performed by: NEUROLOGICAL SURGERY

## 2025-08-05 PROCEDURE — C1894 INTRO/SHEATH, NON-LASER: HCPCS | Performed by: NEUROLOGICAL SURGERY

## 2025-08-05 PROCEDURE — 81025 URINE PREGNANCY TEST: CPT | Performed by: NEUROLOGICAL SURGERY

## 2025-08-05 PROCEDURE — C1760 CLOSURE DEV, VASC: HCPCS | Performed by: NEUROLOGICAL SURGERY

## 2025-08-05 PROCEDURE — S0260 H&P FOR SURGERY: HCPCS | Performed by: NEUROLOGICAL SURGERY

## 2025-08-05 PROCEDURE — C1760 CLOSURE DEV, VASC: HCPCS

## 2025-08-05 PROCEDURE — 76377 3D RENDER W/INTRP POSTPROCES: CPT

## 2025-08-05 PROCEDURE — 25010000002 FENTANYL CITRATE (PF) 50 MCG/ML SOLUTION: Performed by: NEUROLOGICAL SURGERY

## 2025-08-05 PROCEDURE — 25010000002 MIDAZOLAM PER 1 MG: Performed by: NEUROLOGICAL SURGERY

## 2025-08-05 PROCEDURE — 25510000002 IODIXANOL PER 1 ML: Performed by: NEUROLOGICAL SURGERY

## 2025-08-05 RX ORDER — LIDOCAINE HYDROCHLORIDE 10 MG/ML
INJECTION, SOLUTION INFILTRATION; PERINEURAL AS NEEDED
Status: DISCONTINUED | OUTPATIENT
Start: 2025-08-05 | End: 2025-08-05 | Stop reason: HOSPADM

## 2025-08-05 RX ORDER — FENTANYL CITRATE 50 UG/ML
INJECTION, SOLUTION INTRAMUSCULAR; INTRAVENOUS
Status: COMPLETED | OUTPATIENT
Start: 2025-08-05 | End: 2025-08-05

## 2025-08-05 RX ORDER — MIDAZOLAM HYDROCHLORIDE 1 MG/ML
INJECTION, SOLUTION INTRAMUSCULAR; INTRAVENOUS
Status: COMPLETED | OUTPATIENT
Start: 2025-08-05 | End: 2025-08-05

## 2025-08-05 RX ORDER — IODIXANOL 320 MG/ML
300 INJECTION, SOLUTION INTRAVASCULAR
Status: COMPLETED | OUTPATIENT
Start: 2025-08-05 | End: 2025-08-05

## 2025-08-05 RX ORDER — HEPARIN SODIUM 1000 [USP'U]/ML
INJECTION, SOLUTION INTRAVENOUS; SUBCUTANEOUS
Status: COMPLETED | OUTPATIENT
Start: 2025-08-05 | End: 2025-08-05

## 2025-08-05 RX ORDER — ACETAMINOPHEN 325 MG/1
650 TABLET ORAL ONCE AS NEEDED
Status: COMPLETED | OUTPATIENT
Start: 2025-08-05 | End: 2025-08-05

## 2025-08-05 RX ORDER — OXYCODONE AND ACETAMINOPHEN 5; 325 MG/1; MG/1
1 TABLET ORAL ONCE AS NEEDED
Refills: 0 | Status: DISCONTINUED | OUTPATIENT
Start: 2025-08-05 | End: 2025-08-05 | Stop reason: HOSPADM

## 2025-08-05 RX ADMIN — FENTANYL CITRATE 50 MCG: 50 INJECTION, SOLUTION INTRAMUSCULAR; INTRAVENOUS at 08:15

## 2025-08-05 RX ADMIN — FENTANYL CITRATE 50 MCG: 50 INJECTION, SOLUTION INTRAMUSCULAR; INTRAVENOUS at 08:24

## 2025-08-05 RX ADMIN — FENTANYL CITRATE 50 MCG: 50 INJECTION, SOLUTION INTRAMUSCULAR; INTRAVENOUS at 09:12

## 2025-08-05 RX ADMIN — HEPARIN SODIUM 4000 UNITS: 1000 INJECTION, SOLUTION INTRAVENOUS; SUBCUTANEOUS at 08:38

## 2025-08-05 RX ADMIN — MIDAZOLAM 0.5 MG: 1 INJECTION INTRAMUSCULAR; INTRAVENOUS at 08:35

## 2025-08-05 RX ADMIN — MIDAZOLAM 0.5 MG: 1 INJECTION INTRAMUSCULAR; INTRAVENOUS at 08:15

## 2025-08-05 RX ADMIN — IODIXANOL 115 ML: 320 INJECTION, SOLUTION INTRAVASCULAR at 08:54

## 2025-08-05 RX ADMIN — ACETAMINOPHEN 650 MG: 325 TABLET, FILM COATED ORAL at 10:49

## 2025-08-08 ENCOUNTER — OFFICE VISIT (OUTPATIENT)
Dept: NEUROLOGY | Facility: CLINIC | Age: 45
End: 2025-08-08
Payer: COMMERCIAL

## 2025-08-08 VITALS
WEIGHT: 113 LBS | HEART RATE: 86 BPM | BODY MASS INDEX: 18.16 KG/M2 | HEIGHT: 66 IN | DIASTOLIC BLOOD PRESSURE: 82 MMHG | OXYGEN SATURATION: 99 % | SYSTOLIC BLOOD PRESSURE: 112 MMHG

## 2025-08-08 DIAGNOSIS — G43.109 MIGRAINE WITH AURA AND WITHOUT STATUS MIGRAINOSUS, NOT INTRACTABLE: Primary | ICD-10-CM

## 2025-08-08 DIAGNOSIS — I67.1 CEREBRAL ANEURYSM: ICD-10-CM

## 2025-08-08 DIAGNOSIS — Z86.73 HISTORY OF ISCHEMIC STROKE: ICD-10-CM

## 2025-08-08 RX ORDER — DIPHENOXYLATE HYDROCHLORIDE AND ATROPINE SULFATE 2.5; .025 MG/1; MG/1
TABLET ORAL DAILY
COMMUNITY

## 2025-08-13 ENCOUNTER — SPECIALTY PHARMACY (OUTPATIENT)
Dept: NEUROLOGY | Facility: CLINIC | Age: 45
End: 2025-08-13
Payer: COMMERCIAL

## 2025-08-18 ENCOUNTER — TELEPHONE (OUTPATIENT)
Dept: NEUROSURGERY | Facility: CLINIC | Age: 45
End: 2025-08-18

## 2025-08-18 ENCOUNTER — PREP FOR SURGERY (OUTPATIENT)
Dept: OTHER | Facility: HOSPITAL | Age: 45
End: 2025-08-18
Payer: COMMERCIAL

## 2025-08-18 ENCOUNTER — OFFICE VISIT (OUTPATIENT)
Dept: NEUROSURGERY | Facility: CLINIC | Age: 45
End: 2025-08-18
Payer: COMMERCIAL

## 2025-08-18 VITALS
TEMPERATURE: 98.4 F | SYSTOLIC BLOOD PRESSURE: 108 MMHG | OXYGEN SATURATION: 100 % | WEIGHT: 113 LBS | DIASTOLIC BLOOD PRESSURE: 60 MMHG | BODY MASS INDEX: 18.16 KG/M2 | HEIGHT: 66 IN | HEART RATE: 82 BPM

## 2025-08-18 DIAGNOSIS — I67.1 CEREBRAL ANEURYSM: ICD-10-CM

## 2025-08-18 DIAGNOSIS — I67.1 ANEURYSM OF OPHTHALMIC ARTERY: Primary | ICD-10-CM

## 2025-08-18 PROCEDURE — 99024 POSTOP FOLLOW-UP VISIT: CPT | Performed by: NEUROLOGICAL SURGERY

## 2025-08-18 RX ORDER — CLOPIDOGREL BISULFATE 75 MG/1
75 TABLET ORAL DAILY
Qty: 30 TABLET | Refills: 3 | Status: SHIPPED | OUTPATIENT
Start: 2025-08-18

## 2025-08-29 ENCOUNTER — PRE-ADMISSION TESTING (OUTPATIENT)
Dept: PREADMISSION TESTING | Facility: HOSPITAL | Age: 45
End: 2025-08-29
Payer: COMMERCIAL

## 2025-08-29 VITALS
DIASTOLIC BLOOD PRESSURE: 66 MMHG | WEIGHT: 114.7 LBS | OXYGEN SATURATION: 100 % | HEART RATE: 85 BPM | BODY MASS INDEX: 17.38 KG/M2 | HEIGHT: 68 IN | SYSTOLIC BLOOD PRESSURE: 110 MMHG | TEMPERATURE: 98.1 F | RESPIRATION RATE: 16 BRPM

## 2025-08-29 LAB
ANION GAP SERPL CALCULATED.3IONS-SCNC: 11.2 MMOL/L (ref 5–15)
BUN SERPL-MCNC: 14 MG/DL (ref 6–20)
BUN/CREAT SERPL: 17.5 (ref 7–25)
CALCIUM SPEC-SCNC: 9.2 MG/DL (ref 8.6–10.5)
CHLORIDE SERPL-SCNC: 106 MMOL/L (ref 98–107)
CO2 SERPL-SCNC: 21.8 MMOL/L (ref 22–29)
CREAT SERPL-MCNC: 0.8 MG/DL (ref 0.57–1)
DEPRECATED RDW RBC AUTO: 42.6 FL (ref 37–54)
EGFRCR SERPLBLD CKD-EPI 2021: 93.3 ML/MIN/1.73
ERYTHROCYTE [DISTWIDTH] IN BLOOD BY AUTOMATED COUNT: 12.5 % (ref 12.3–15.4)
GLUCOSE SERPL-MCNC: 88 MG/DL (ref 65–99)
HCT VFR BLD AUTO: 34.1 % (ref 34–46.6)
HGB BLD-MCNC: 11.2 G/DL (ref 12–15.9)
MCH RBC QN AUTO: 30.4 PG (ref 26.6–33)
MCHC RBC AUTO-ENTMCNC: 32.8 G/DL (ref 31.5–35.7)
MCV RBC AUTO: 92.4 FL (ref 79–97)
PA ADP PRP-ACNC: 14 PRU
PLATELET # BLD AUTO: 196 10*3/MM3 (ref 140–450)
PMV BLD AUTO: 10.8 FL (ref 6–12)
POTASSIUM SERPL-SCNC: 3.8 MMOL/L (ref 3.5–5.2)
RBC # BLD AUTO: 3.69 10*6/MM3 (ref 3.77–5.28)
SODIUM SERPL-SCNC: 139 MMOL/L (ref 136–145)
WBC NRBC COR # BLD AUTO: 3.07 10*3/MM3 (ref 3.4–10.8)

## 2025-08-29 PROCEDURE — 85576 BLOOD PLATELET AGGREGATION: CPT | Performed by: NEUROLOGICAL SURGERY

## 2025-08-29 PROCEDURE — 80048 BASIC METABOLIC PNL TOTAL CA: CPT

## 2025-08-29 PROCEDURE — 36415 COLL VENOUS BLD VENIPUNCTURE: CPT

## 2025-08-29 PROCEDURE — 85027 COMPLETE CBC AUTOMATED: CPT

## (undated) DEVICE — CATH VERT IMPRESSA 5F .035IN 100CM

## (undated) DEVICE — CVR PROB 96IN LF STRL

## (undated) DEVICE — APPL CHLORAPREP HI/LITE 26ML ORNG

## (undated) DEVICE — MYNXGRIP 5F: Brand: MYNXGRIP

## (undated) DEVICE — BG WAST DISPOSABLE DEPOT W/TBG48IN S/COCK SPK1400

## (undated) DEVICE — PK ANGIO CERBRL RAD 40

## (undated) DEVICE — PINNACLE INTRODUCER SHEATH: Brand: PINNACLE

## (undated) DEVICE — RADIFOCUS GLIDEWIRE: Brand: GLIDEWIRE

## (undated) DEVICE — GW FC J TFE/COAT .035 3MM 145CM

## (undated) DEVICE — VLV HEMO CARD PHD 2/SEAL CLR

## (undated) DEVICE — STPCK 3/WY HP M/RA W/OFF/HNDL 1050PSI STRL

## (undated) DEVICE — GLV SURG BIOGEL LTX PF 8

## (undated) DEVICE — SYR LL TP 10ML STRL

## (undated) DEVICE — ST ACC MICROPUNCTURE STFF .018 ECHO/PLAT/TP 4F/10CM 21G/7CM

## (undated) DEVICE — SOL NACL 0.9PCT 1000ML

## (undated) DEVICE — DRSNG SURESITE WNDW 4X4.5

## (undated) DEVICE — 1000ML,PRESSURE INFUSER W/STOPCOCK: Brand: MEDLINE

## (undated) DEVICE — RADIFOCUS TORQUE DEVICE MULTI-TORQUE VISE: Brand: RADIFOCUS TORQUE DEVICE

## (undated) RX ORDER — SULFAMETHOXAZOLE/TRIMETHOPRIM 800-160 MG
TABLET ORAL
Status: DISPENSED
Start: 2019-04-18